# Patient Record
Sex: MALE | Race: BLACK OR AFRICAN AMERICAN | NOT HISPANIC OR LATINO | Employment: OTHER | URBAN - METROPOLITAN AREA
[De-identification: names, ages, dates, MRNs, and addresses within clinical notes are randomized per-mention and may not be internally consistent; named-entity substitution may affect disease eponyms.]

---

## 2018-08-16 ENCOUNTER — OFFICE VISIT (OUTPATIENT)
Dept: UROLOGY | Facility: CLINIC | Age: 53
End: 2018-08-16
Payer: COMMERCIAL

## 2018-08-16 ENCOUNTER — HOSPITAL ENCOUNTER (OUTPATIENT)
Dept: RADIOLOGY | Facility: HOSPITAL | Age: 53
Discharge: HOME | End: 2018-08-16
Attending: UROLOGY
Payer: COMMERCIAL

## 2018-08-16 VITALS
BODY MASS INDEX: 35.08 KG/M2 | HEIGHT: 74 IN | HEART RATE: 61 BPM | DIASTOLIC BLOOD PRESSURE: 81 MMHG | RESPIRATION RATE: 16 BRPM | WEIGHT: 273.38 LBS | SYSTOLIC BLOOD PRESSURE: 131 MMHG

## 2018-08-16 DIAGNOSIS — N28.89 RIGHT RENAL MASS: Primary | ICD-10-CM

## 2018-08-16 DIAGNOSIS — N28.89 RIGHT RENAL MASS: ICD-10-CM

## 2018-08-16 PROCEDURE — 99203 OFFICE O/P NEW LOW 30 MIN: CPT | Mod: PBBFAC,25 | Performed by: UROLOGY

## 2018-08-16 PROCEDURE — 99205 OFFICE O/P NEW HI 60 MIN: CPT | Mod: S$PBB,,, | Performed by: UROLOGY

## 2018-08-16 PROCEDURE — 71046 X-RAY EXAM CHEST 2 VIEWS: CPT | Mod: TC

## 2018-08-16 PROCEDURE — 71046 X-RAY EXAM CHEST 2 VIEWS: CPT | Mod: 26,,, | Performed by: RADIOLOGY

## 2018-08-16 PROCEDURE — 99999 PR PBB SHADOW E&M-NEW PATIENT-LVL III: CPT | Mod: PBBFAC,,, | Performed by: UROLOGY

## 2018-08-16 RX ORDER — GABAPENTIN 300 MG/1
300 CAPSULE ORAL 3 TIMES DAILY
COMMUNITY
End: 2018-09-18 | Stop reason: ALTCHOICE

## 2018-08-16 NOTE — PROGRESS NOTES
Clinic Note  8/16/2018      Subjective:         Chief Complaint:   SABA Reynolds is a 53 y.o. male who was recently diagnosed with a right renal mass. Lesion measures 9.3 x 7.2 x 7.3 cm. Ct images reviewed. Single artery, single vein, no thrombus, no nodes. Lower pole lesion that extends deeply into hilum. No suggestion on collecting system origin on CT scan.  Here with his girlfriend Anna and mother. Retired from [employer].  Had CT scan for left sided back pain and stone rule out.    No past medical history on file.  No family history on file.  Social History     Socioeconomic History    Marital status: Legally      Spouse name: Not on file    Number of children: Not on file    Years of education: Not on file    Highest education level: Not on file   Social Needs    Financial resource strain: Not on file    Food insecurity - worry: Not on file    Food insecurity - inability: Not on file    Transportation needs - medical: Not on file    Transportation needs - non-medical: Not on file   Occupational History    Not on file   Tobacco Use    Smoking status: Former Smoker     Packs/day: 0.50     Years: 6.00     Pack years: 3.00    Smokeless tobacco: Never Used   Substance and Sexual Activity    Alcohol use: No     Frequency: Never    Drug use: No    Sexual activity: Yes     Partners: Female   Other Topics Concern    Not on file   Social History Narrative    Not on file     No past surgical history on file.  There is no problem list on file for this patient.    Review of Systems   Constitutional: Negative for appetite change, chills, fatigue, fever and unexpected weight change.   HENT: Negative for nosebleeds.    Respiratory: Negative for shortness of breath and wheezing.    Cardiovascular: Negative for chest pain, palpitations and leg swelling.   Gastrointestinal: Negative for abdominal distention, abdominal pain, constipation, diarrhea, nausea and vomiting.   Genitourinary:  "Negative for dysuria and hematuria.   Musculoskeletal: Negative for arthralgias and back pain.   Skin: Negative for pallor.   Neurological: Negative for dizziness, seizures and syncope.   Hematological: Negative for adenopathy.   Psychiatric/Behavioral: Negative for dysphoric mood.         Objective:      /81   Pulse 61   Resp 16   Ht 6' 2" (1.88 m)   Wt 124 kg (273 lb 5.9 oz)   BMI 35.10 kg/m²   Estimated body mass index is 35.1 kg/m² as calculated from the following:    Height as of this encounter: 6' 2" (1.88 m).    Weight as of this encounter: 124 kg (273 lb 5.9 oz).  Physical Exam   Constitutional: He is oriented to person, place, and time. He appears well-developed and well-nourished. No distress.   HENT:   Head: Atraumatic.   Neck: No tracheal deviation present.   Cardiovascular: Normal rate.    Pulmonary/Chest: Effort normal. No respiratory distress. He has no wheezes.   Abdominal: Soft. Bowel sounds are normal. He exhibits no distension and no mass. There is no tenderness. There is no rebound and no guarding.   Neurological: He is alert and oriented to person, place, and time.   Skin: Skin is warm and dry. He is not diaphoretic.     Psychiatric: He has a normal mood and affect. His behavior is normal. Judgment and thought content normal.         Assessment and Plan:           Problem List Items Addressed This Visit     None      Visit Diagnoses     Right renal mass    -  Primary          Follow up:    Long discussion about management of SRMs. Discussed malignant vs benign histology.  Discussed surveillance and surveillance  protocol, biopsy, ablation techniques ( including cryoabalation and HFRA), and resection techniques including robotic and open partial.Also discussed open, lap and robotic nephrectomy. Discussed risks and complications of all procedures, risk of reoperation and risks of recurrence and need for additional therapy Answered questions and addressed concerns.  With size and " location of tumor recommend lap nephrectomy.  Discussed risks, and complications.  The patient will meet with our  Michelle today to find a date for surgery and begin preparation for surgery. Will also receive our handout on NPO guidelines and preop hydration. Patient will also receive information and education on preoperative skin preparation and postop wound care.   CXR, CMP,CBC to complete staging.  I spent over 45 minutes with the patient. Over 50% of the visit was spent in counseling.     Keven Vivar

## 2018-08-22 ENCOUNTER — TELEPHONE (OUTPATIENT)
Dept: UROLOGY | Facility: CLINIC | Age: 53
End: 2018-08-22

## 2018-08-22 DIAGNOSIS — N28.89 RIGHT RENAL MASS: Primary | ICD-10-CM

## 2018-09-12 ENCOUNTER — ANESTHESIA EVENT (OUTPATIENT)
Dept: SURGERY | Facility: HOSPITAL | Age: 53
DRG: 661 | End: 2018-09-12
Payer: COMMERCIAL

## 2018-09-12 ENCOUNTER — TELEPHONE (OUTPATIENT)
Dept: PREADMISSION TESTING | Facility: HOSPITAL | Age: 53
End: 2018-09-12

## 2018-09-12 DIAGNOSIS — Z01.818 PREOP TESTING: Primary | ICD-10-CM

## 2018-09-12 RX ORDER — ALLOPURINOL 100 MG/1
100 TABLET ORAL DAILY PRN
COMMUNITY

## 2018-09-12 NOTE — PRE ADMISSION SCREENING
Anesthesia Assessment: Preoperative EQUATION    Planned Procedure: Procedure(s) (LRB):  NEPHRECTOMY, LAPAROSCOPIC/ POSS OPEN (Right)  Requested Anesthesia Type:General  Surgeon: Keven Vivar MD  Service: Urology  Known or anticipated Date of Surgery:9/24/2018    Surgeon notes: reviewed    Electronic QUestionnaire Assessment completed via nurse interview with patient.        No aq        Triage considerations:         Previous anesthesia records:Not available  Knee surgery    Last PCP note: sees a nurse practioner in [facility]  Subspecialty notes: urology    Other important co-morbidities:   HTN  Gout  Right renal Mass     Tests already available:  Available tests,  within 1 month , within [facility] .            Instructions given. (See in Nurse's note)    Optimization:  Anesthesia Preop Clinic Assessment  Indicated    Medical Opinion Indicated           Plan:    Testing:  T&S and EKG   Pre-anesthesia  visit       Visit focus: concerns in complex and/or prolonged anesthesia     Consultation:IM Perioperative Hospitalist     Navigation: Tests Scheduled.              Consults scheduled.             Results will be tracked by Preop Clinic.

## 2018-09-12 NOTE — TELEPHONE ENCOUNTER
----- Message from Antoinette Esqueda RN sent at 9/12/2018 11:05 AM CDT -----  Needs EKG, LAB, POC, OPOC coming  9/19/18 to see Cb

## 2018-09-12 NOTE — ANESTHESIA PREPROCEDURE EVALUATION
Anesthesia Assessment: Preoperative EQUATION     Planned Procedure: Procedure(s) (LRB):  NEPHRECTOMY, LAPAROSCOPIC/ POSS OPEN (Right)  Requested Anesthesia Type:General  Surgeon: Keven Vivar MD  Service: Urology  Known or anticipated Date of Surgery:9/24/2018     Surgeon notes: reviewed     Electronic QUestionnaire Assessment completed via nurse interview with patient.         No aq           Triage considerations:            Previous anesthesia records:Not available  Knee surgery     Last PCP note: sees a nurse practioner in [facility]  Subspecialty notes: urology     Other important co-morbidities:   HTN  Gout  Right renal Mass     Tests already available:  Available tests,  within 1 month , within [facility] .                            Instructions given. (See in Nurse's note)     Optimization:  Anesthesia Preop Clinic Assessment  Indicated    Medical Opinion Indicated                                        Plan:    Testing:  T&S and EKG   Pre-anesthesia  visit                                        Visit focus: concerns in complex and/or prolonged anesthesia                           Consultation:IM Perioperative Hospitalist      Navigation: Tests Scheduled.                         Consults scheduled.                        Results will be tracked by Preop Clinic.                                                                                                              09/12/2018  Jesu Reynolds is a 53 y.o., male.  Pre-operative evaluation for Procedure(s) (LRB):  NEPHRECTOMY, LAPAROSCOPIC/ POSS OPEN (Right)    Jesu Reynolds is a 53 y.o. male     LDA:     Prev airway:     Drips:     Patient Active Problem List   Diagnosis    Essential hypertension    Gout    Arthritis    Class 2 obesity with body mass index (BMI) of 35.0 to 35.9 in adult    Tattoos    Snoring    Renal impairment    Hypercalcemia    Serum total bilirubin elevated    Right renal mass    Fatty liver       Review  of patient's allergies indicates:  No Known Allergies     No current facility-administered medications on file prior to encounter.      Current Outpatient Medications on File Prior to Encounter   Medication Sig Dispense Refill    allopurinol (ZYLOPRIM) 100 MG tablet Take 100 mg by mouth every morning.       azilsartan med-chlorthalidone (EDARBYCLOR) 40-25 mg Tab Take by mouth every evening.          Past Surgical History:   Procedure Laterality Date    KNEE CARTILAGE SURGERY Right     5        Social History     Socioeconomic History    Marital status: Legally      Spouse name: Not on file    Number of children: Not on file    Years of education: Not on file    Highest education level: Not on file   Social Needs    Financial resource strain: Not on file    Food insecurity - worry: Not on file    Food insecurity - inability: Not on file    Transportation needs - medical: Not on file    Transportation needs - non-medical: Not on file   Occupational History    Not on file   Tobacco Use    Smoking status: Former Smoker     Packs/day: 0.50     Years: 6.00     Pack years: 3.00     Last attempt to quit:      Years since quittin.7    Smokeless tobacco: Never Used   Substance and Sexual Activity    Alcohol use: No     Frequency: Never    Drug use: No    Sexual activity: Yes     Partners: Female   Other Topics Concern    Not on file   Social History Narrative    Not on file         Vital Signs Range (Last 24H):  Temp:  [36.4 °C (97.5 °F)]   Pulse:  [65]   Resp:  [16]   BP: (146)/(84)   SpO2:  [99 %]       CBC: No results for input(s): WBC, RBC, HGB, HCT, PLT, MCV, MCH, MCHC in the last 72 hours.    CMP: No results for input(s): NA, K, CL, CO2, BUN, CREATININE, GLU, MG, PHOS, CALCIUM, ALBUMIN, PROT, ALKPHOS, ALT, AST, BILITOT in the last 72 hours.          Diagnostic Studies:        Anesthesia Evaluation         Review of Systems  Anesthesia Hx:  No problems with previous Anesthesia  History of prior surgery of interest to airway management or planning: Previous anesthesia: MAC  10/2013: Colonoscopy with MAC.  Denies Family Hx of Anesthesia complications.   Denies Personal Hx of Anesthesia complications.   Social:  Patient's occupation is Retired. Tobacco Use: Former smoker for 6 years, quit smoking >10 years ago Denies Alcohol Use.   Hematology/Oncology:        Hematology Comments: Hypercalcemia: 11.0 on 8/16/18 labs   EENT/Dental:   Denies Throat Symptoms Denies Jaw Problems   Cardiovascular:   Hypertension, well controlled  Functional Capacity Plays basketball: denies CP/SOB  Denies Deep Venous Thrombosis (DVT)  Hypertension , Recent typical clinic B/P of 114/75 @ POC visit    Pulmonary:  Denies Asthma.  Denies Chronic Obstructive Pulmonary Disease (COPD).  Possible Obstructive Sleep Apnea , (STOP/BANG) Symptoms S - Snoring (loud), A - Age > 50 and G - Gender (Male > Female)    Renal/:   Right renal mass Denies Kidney Function/Disease    Hepatic/GI:  Esophageal / Stomach Disorders Gerd Controlled by PRN antireflux medication.  Liver Disease, Fatty Liver    Musculoskeletal:  Joint Disease:  Arthritis, Gout    Neurological:  Denies Seizure Disorder  Denies CVA - Cerebrovasular Accident  Denies TIA - Transient Ischemic Attack    Endocrine:  Denies Diabetes  Denies Thyroid Disease        Physical Exam  General:  Obesity, Well nourished    Airway/Jaw/Neck:  Airway Findings: Mouth Opening: Normal Tongue: Normal  General Airway Assessment: Adult  Mallampati: I  TM Distance: Normal, at least 6 cm  Jaw/Neck Findings:  Neck ROM: Normal ROM      Dental:  Dental Findings: In tact        Mental Status:  Mental Status Findings:  Cooperative, Alert and Oriented         Anesthesia Plan  Type of Anesthesia, risks & benefits discussed:  Anesthesia Type:  general  Patient's Preference:   Intra-op Monitoring Plan:   Intra-op Monitoring Plan Comments:   Post Op Pain Control Plan:   Post Op Pain Control Plan  Comments:   Induction:   IV  Beta Blocker:  Patient is not currently on a Beta-Blocker (No further documentation required).       Informed Consent: Patient understands risks and agrees with Anesthesia plan.  Questions answered. Anesthesia consent signed with patient.  ASA Score: 3     Day of Surgery Review of History & Physical:            Ready For Surgery From Anesthesia Perspective.     The patient was seen by Perioperative Internal Medicine physician Dr. Lozada on 9/19/18 , please see recommendations.        Jose Angel Oro RN

## 2018-09-18 PROBLEM — I10 ESSENTIAL HYPERTENSION: Status: ACTIVE | Noted: 2018-09-18

## 2018-09-19 ENCOUNTER — INITIAL CONSULT (OUTPATIENT)
Dept: INTERNAL MEDICINE | Facility: CLINIC | Age: 53
End: 2018-09-19
Payer: COMMERCIAL

## 2018-09-19 ENCOUNTER — OFFICE VISIT (OUTPATIENT)
Dept: UROLOGY | Facility: CLINIC | Age: 53
End: 2018-09-19
Payer: COMMERCIAL

## 2018-09-19 ENCOUNTER — HOSPITAL ENCOUNTER (OUTPATIENT)
Dept: PREADMISSION TESTING | Facility: HOSPITAL | Age: 53
Discharge: HOME | End: 2018-09-19
Attending: ANESTHESIOLOGY
Payer: COMMERCIAL

## 2018-09-19 ENCOUNTER — HOSPITAL ENCOUNTER (OUTPATIENT)
Dept: CARDIOLOGY | Facility: CLINIC | Age: 53
Discharge: HOME | End: 2018-09-19
Payer: COMMERCIAL

## 2018-09-19 VITALS
BODY MASS INDEX: 35.29 KG/M2 | DIASTOLIC BLOOD PRESSURE: 75 MMHG | HEART RATE: 77 BPM | WEIGHT: 275 LBS | OXYGEN SATURATION: 96 % | TEMPERATURE: 98 F | HEIGHT: 74 IN | RESPIRATION RATE: 16 BRPM | SYSTOLIC BLOOD PRESSURE: 114 MMHG

## 2018-09-19 VITALS
TEMPERATURE: 98 F | SYSTOLIC BLOOD PRESSURE: 114 MMHG | HEIGHT: 74 IN | DIASTOLIC BLOOD PRESSURE: 75 MMHG | WEIGHT: 275 LBS | HEART RATE: 77 BPM | BODY MASS INDEX: 35.29 KG/M2 | OXYGEN SATURATION: 96 %

## 2018-09-19 DIAGNOSIS — N28.89 RIGHT RENAL MASS: ICD-10-CM

## 2018-09-19 DIAGNOSIS — Z01.818 PREOP TESTING: ICD-10-CM

## 2018-09-19 DIAGNOSIS — I10 ESSENTIAL HYPERTENSION: ICD-10-CM

## 2018-09-19 DIAGNOSIS — N28.9 RENAL IMPAIRMENT: ICD-10-CM

## 2018-09-19 DIAGNOSIS — L81.8 TATTOOS: ICD-10-CM

## 2018-09-19 DIAGNOSIS — R06.83 SNORING: ICD-10-CM

## 2018-09-19 DIAGNOSIS — M10.9 GOUT, UNSPECIFIED CAUSE, UNSPECIFIED CHRONICITY, UNSPECIFIED SITE: ICD-10-CM

## 2018-09-19 DIAGNOSIS — E83.52 HYPERCALCEMIA: ICD-10-CM

## 2018-09-19 DIAGNOSIS — Z01.818 PREOP EXAMINATION: Primary | ICD-10-CM

## 2018-09-19 DIAGNOSIS — M19.90 ARTHRITIS: ICD-10-CM

## 2018-09-19 DIAGNOSIS — R17 SERUM TOTAL BILIRUBIN ELEVATED: ICD-10-CM

## 2018-09-19 DIAGNOSIS — K76.0 FATTY LIVER: ICD-10-CM

## 2018-09-19 DIAGNOSIS — E66.9 CLASS 2 OBESITY WITH BODY MASS INDEX (BMI) OF 35.0 TO 35.9 IN ADULT, UNSPECIFIED OBESITY TYPE, UNSPECIFIED WHETHER SERIOUS COMORBIDITY PRESENT: ICD-10-CM

## 2018-09-19 PROCEDURE — 99213 OFFICE O/P EST LOW 20 MIN: CPT | Mod: PBBFAC,25 | Performed by: HOSPITALIST

## 2018-09-19 PROCEDURE — 99499 UNLISTED E&M SERVICE: CPT | Mod: S$PBB,,, | Performed by: UROLOGY

## 2018-09-19 PROCEDURE — 99204 OFFICE O/P NEW MOD 45 MIN: CPT | Mod: S$PBB,,, | Performed by: HOSPITALIST

## 2018-09-19 PROCEDURE — 93010 ELECTROCARDIOGRAM REPORT: CPT | Mod: S$PBB,,, | Performed by: INTERNAL MEDICINE

## 2018-09-19 PROCEDURE — 93005 ELECTROCARDIOGRAM TRACING: CPT | Mod: PBBFAC | Performed by: INTERNAL MEDICINE

## 2018-09-19 PROCEDURE — 99999 PR PBB SHADOW E&M-EST. PATIENT-LVL III: CPT | Mod: PBBFAC,,, | Performed by: HOSPITALIST

## 2018-09-19 RX ORDER — HEPARIN SODIUM 5000 [USP'U]/ML
5000 INJECTION, SOLUTION INTRAVENOUS; SUBCUTANEOUS
Status: CANCELLED | OUTPATIENT
Start: 2018-09-19 | End: 2018-09-19

## 2018-09-19 RX ORDER — LIDOCAINE HYDROCHLORIDE 10 MG/ML
1 INJECTION, SOLUTION EPIDURAL; INFILTRATION; INTRACAUDAL; PERINEURAL ONCE
Status: CANCELLED | OUTPATIENT
Start: 2018-09-19 | End: 2018-09-19

## 2018-09-19 RX ORDER — ACETAMINOPHEN 10 MG/ML
1000 INJECTION, SOLUTION INTRAVENOUS
Status: CANCELLED | OUTPATIENT
Start: 2018-09-19 | End: 2018-09-19

## 2018-09-19 RX ORDER — SODIUM CHLORIDE 9 MG/ML
INJECTION, SOLUTION INTRAVENOUS CONTINUOUS
Status: CANCELLED | OUTPATIENT
Start: 2018-09-19

## 2018-09-19 NOTE — ASSESSMENT & PLAN NOTE
Azilsartan,HCTZ  Nightly   Home BP - 115-117/ 75-80  Hypertension-  Blood pressure is acceptable . I suggest continuation of Azilsartan,HCTZ during the entire perioperative period..I suggest addressing pain control as uncontrolled pain can increased blood pressure

## 2018-09-19 NOTE — ASSESSMENT & PLAN NOTE
Not known to have elevated calcium   Suggest hydration, follow up, cardiac monitoring   Chlorthalidone could be contributing   Usually drinks about a gallon of water a day

## 2018-09-19 NOTE — ASSESSMENT & PLAN NOTE
Risk factor for Hepatitis C discussed   To his understanding no liver disease   No suggestion of hepatic decompensation

## 2018-09-19 NOTE — DISCHARGE INSTRUCTIONS
Your surgery has been scheduled for:__________________________________________    You should report to:  ____[facility], located on the [place] side of the first floor of the           [facility] ([phone])  ____[facility], located on the [facility] side of the            Second floor of the [facility] ([phone])  ____3rd Floor [facility] located on the [facility] side of the [facility] [phone]  Please Note   - Tell your doctor if you take Aspirin, products containing Aspirin, herbal medications  or blood thinners, such as Coumadin, Ticlid, or Plavix.  (Consult your provider regarding holding or stopping before surgery).  - Arrange for someone to drive you home following surgery.  You will not be allowed to leave the surgical facility alone or drive yourself home following sedation and anesthesia.  Before Surgery  - Stop taking all herbal medications 14days prior to surgery  - No Motrin/Advil (Ibuprofen) 7 days before surgery  - No Aleve (Naproxen) 7 days before surgery  - Stop Taking Asprin, products containing Asprin _____days before surgery  - Stop taking blood thinners_______days before surgery  - No Goody's/BC  Powder 7 days before surgery  - Refrain from drinking alcoholic beverages for 24hours before and after surgery  - Stop or limit smoking _________days before surgery  - You may take Tylenol for pain  Night before Surgery  Stop ALL solid food, gum, candy (including vitamins) 8 hours before arrival time.  (Please note: If your surgeon gives you different eating and drinking instructions, please follow surgeon's directions.)  Stop all CLOUDY liquids: coffee with creamer, formula, tube feeds, cloudy juices, non-human milk and breast milk with additives, 6 hours prior to arrival time.  Stop plain breast milk 4 hours prior to arrival time.  The patient should be ENCOURAGED to drink carbohydrate-rich  clear liquids (sports drinks, clear juices) until 2 hours prior to arrival time.  CLEAR liquids include only water, black coffee NO creamer, clear oral rehydration drinks, clear sports drinks or clear fruit juices (no orange juice, no pulpy juices, no apple cider). Advise patients if they can read newsprint through the liquid, it qualifies as clear liquid.   IF IN DOUBT, drink water instead.   - Take a shower or bath (shower is recommended).  Bathe with Hibiclens soap or an antibacterial soap from the neck down.  If not supplied by your surgeon, hibiclens soap will need to be purchased over the counter in pharmacy.  Rinse soap off thoroughly.  - Shampoo your hair with your regular shampoo  The Day of Surgery  · NOTHING TO  DRINK 2 hours before arrival time. If you are told to take medication on the morning of surgery, it may be taken with a sip of water.   - Take another bath or shower with hibiclens or any antibacterial soap, to reduce the chance of infection.  - Take heart and blood pressure medications with a small sip of water, as advised by the perioperative team.  - Do not take fluid pills  - You may brush your teeth and rinse your mouth, but do not swall any additional water.   - Do not apply perfumes, powder, body lotions or deodorant on the day of surgery.  - Nail polish should be removed.  - Do not wear makeup or moisturizer  - Wear comfortable clothes, such as a button front shirt and loose fitting pants.  - Leave all jewelry, including body piercings, and valuables at home.    - Bring any devices you will neeed after surgery such as crutches or canes.  - If you have sleep apnea, please bring your CPAP machine  In the event that your physical condition changes including the onset of a cold or respiratory illness, or if you have to delay or cancel your surgery, please notify your surgeon.  Anesthesia: General Anesthesia     You are watched continuously during your procedure by your anesthesia provider.      Youre due to have surgery. During surgery, youll be given medicine called anesthesia or anesthetic. This will keep you comfortable and pain-free. Your anesthesia provider will use general anesthesia.  What is general anesthesia?  General anesthesia puts you into a state like deep sleep. It goes into the bloodstream (IV anesthetics), into the lungs (gas anesthetics), or both. You feel nothing during the procedure. You will not remember it. During the procedure, the anesthesia provider monitors you continuously. He or she checks your heart rate and rhythm, blood pressure, breathing, and blood oxygen.  · IV anesthetics. IV anesthetics are given through an IV line in your arm. Theyre often given first. This is so you are asleep before a gas anesthetic is started. Some kinds of IV anesthetics relieve pain. Others relax you. Your doctor will decide which kind is best in your case.  · Gas anesthetics. Gas anesthetics are breathed into the lungs. They are often used to keep you asleep. They can be given through a facemask or a tube placed in your larynx or trachea (breathing tube).  ? If you have a facemask, your anesthesia provider will most likely place it over your nose and mouth while youre still awake. Youll breathe oxygen through the mask as your IV anesthetic is started. Gas anesthetic may be added through the mask.  ? If you have a tube in the larynx or trachea, it will be inserted into your throat after youre asleep.  Anesthesia tools and medicines  You will likely have:  · IV anesthetics. These are put into an IV line into your bloodstream.  · Gas anesthetics. You breathe these anesthetics into your lungs, where they pass into your bloodstream.  · Pulse oximeter. This is a small clip that is attached to the end of your finger. This measures your blood oxygen level.  · Electrocardiography leads (electrodes). These are small sticky pads that are placed on your chest. They record your heart rate and  rhythm.  · Blood pressure cuff. This reads your blood pressure.  Risks and possible complications  General anesthesia has some risks. These include:  · Breathing problems  · Nausea and vomiting  · Sore throat or hoarseness (usually temporary)  · Allergic reaction to the anesthetic  · Irregular heartbeat (rare)  · Cardiac arrest (rare)   Anesthesia safety  · Follow all instructions you are given for how long not to eat or drink before your procedure.  · Be sure your doctor knows what medicines and drugs you take. This includes over-the-counter medicines, herbs, supplements, alcohol or other drugs. You will be asked when those were last taken.  · Have an adult family member or friend drive you home after the procedure.  · For the first 24 hours after your surgery:  ? Do not drive or use heavy equipment.  ? Do not make important decisions or sign legal documents. If important decisions or signing legal documents is necessary during the first 24 hours after surgery, have a trusted family member or spouse act on your behalf.  ? Avoid alcohol.  ? Have a responsible adult stay with you. He or she can watch for problems and help keep you safe.  Date Last Reviewed: 12/1/2016  © 4700-2383 [employer] [address] [place] [address], [place] [address]. All rights reserved. This information is not intended as a substitute for professional medical care. Always follow your healthcare professional's instructions

## 2018-09-19 NOTE — ASSESSMENT & PLAN NOTE
Lost 80 pounds over the years , intentionally  Not known to  have sleep apnea , diabetes   Significant other reports snoring ,apnea  Encouraged weight loss

## 2018-09-19 NOTE — HPI
History of present illness- I had the pleasure of meeting this pleasant 53 y.o. gentleman in the pre op clinic prior to his elective Urological surgery. The patient is new to me . Jesu was accompanied by significant other Anna.    I have obtained the history by speaking to the patient and by reviewing the electronic health records.    Events leading up to surgery / History of presenting illness -    Right renal mass  Found it incidentally about 3-4  weeks ago , when he had a CT scan for a Left sided mid abdomen  - through back pain ( unlike sciatic pain that he usually has ) and more pain that sciatic pain. usually has no pain on the front of the abdomen   Also has pain down the Left side which he also did not have  this time  Went to ER at [facility] and had a plain CT scan that to his understanding did not know kidney stone on the Left side , how ever showed a Rt renal mass   Had abdominal pain , 1 week before ( Prior to ER presentation ) , felt like a muscle , could not correlate to food, bowels, bladder   To his understanding no UTI ( Both in ER and by the Urologist )   Was discharged from ER, pain resolved by then  Went to Urologist and had had a CT with contrast and was referred to [facility]   No longer has pain that lead to ER presentation   No pain from this .No aggravating factors. No relieving factors     Relevant health conditions of significance for the perioperative period/ History of presenting illness -    Patient Active Problem List    Diagnosis Date Noted    Fatty liver 09/20/2018    Gout 09/19/2018    Arthritis 09/19/2018    Class 2 obesity with body mass index (BMI) of 35.0 to 35.9 in adult 09/19/2018    Tattoos 09/19/2018    Snoring 09/19/2018    Renal impairment 09/19/2018    Hypercalcemia 09/19/2018    Serum total bilirubin elevated 09/19/2018    Right renal mass 09/19/2018    Essential hypertension 09/18/2018     Not known to have heart disease , Diabetes Mellitus, Lung  disease

## 2018-09-19 NOTE — LETTER
September 19, 2018      Keven Vivar MD  [address]  [place] [address]           [facility] - Pre Op Consult  [address] [facility] [address]  Phone: [phone]          Patient: Jesu Reynolds   MR Number: [id number]   YOB: 1965   Date of Visit: 9/19/2018       Dear Dr. Maite Cheney:    Thank you for referring Jesu Reynolds to me for evaluation. Attached you will find relevant portions of my assessment and plan of care.    If you have questions, please do not hesitate to call me. I look forward to following Jesu Reynolds along with you.    Sincerely,    Sammi Lozada MD    Enclosure  CC:  Maite Cheney MD    If you would like to receive this communication electronically, please contact [email or web address] or [phone] to request more information on [email or web address] Link access.    For providers and/or their staff who would like to refer a patient to [facility], please contact us through our one-stop-shop provider referral line, [facility], at [phone].    If you feel you have received this communication in error or would no longer like to receive these types of communications, please e-mail [email or web address]

## 2018-09-19 NOTE — ASSESSMENT & PLAN NOTE
8/16/2018 - Creatinine 1.4   Not known to have kidney problem   chronic NSAID use for many years - quit 15 years ago   NSAID effects , hydration discussed    I  suggest monitoring renal function, in put and out put status mariel-operatively. I  suggest avoiding nephrotoxic medication including NSAIDs, COX2 inhibitors, intravenous contrast agent,avoiding hypotension to prevent further renal impairment.

## 2018-09-19 NOTE — ASSESSMENT & PLAN NOTE
Was fasting that day raising the possibility of Gilbert's syndrome   Normal alkaline phosphatase  Suggested follow up

## 2018-09-19 NOTE — ASSESSMENT & PLAN NOTE
Possible sleep apnea- I suggest a sleep study and suggest caution with usage of medication that can cause respiratory suppression in the perioperative period  potential ramifications of untreated sleep apnea, which could include daytime sleepiness, hypertension, heart disease and stroke were discussed  Avoidance of supine sleep, weight gain and alcoholic beverages discussed since all of these can worsen SELENE

## 2018-09-19 NOTE — PROGRESS NOTES
[facility] - Pre Op Consult  Progress Note    Patient Name: Jesu Reynolds  MRN: [id number]  Date of Evaluation- 09/20/2018  PCP- Primary Doctor No    Future cases for Jesu Reynolds [[id number]]     Case ID Status Date Time [place] Procedure Provider Location    [id number] [facility] 9/24/2018 11:00  NEPHRECTOMY, LAPAROSCOPIC/ POSS OPEN Keven Vivar MD [[id number]] [facility] OR 2ND FLR          HPI:  History of present illness- I had the pleasure of meeting this pleasant 53 y.o. gentleman in the pre op clinic prior to his elective Urological surgery. The patient is new to me . Jseu was accompanied by significant other Anna.    I have obtained the history by speaking to the patient and by reviewing the electronic health records.    Events leading up to surgery / History of presenting illness -    Right renal mass  Found it incidentally about 3-4  weeks ago , when he had a CT scan for a Left sided mid abdomen  - through back pain ( unlike sciatic pain that he usually has ) and more pain that sciatic pain. usually has no pain on the front of the abdomen   Also has pain down the Left side which he also did not have  this time  Went to ER at [facility] and had a plain CT scan that to his understanding did not know kidney stone on the Left side , how ever showed a Rt renal mass   Had abdominal pain , 1 week before ( Prior to ER presentation ) , felt like a muscle , could not correlate to food, bowels, bladder   To his understanding no UTI ( Both in ER and by the Urologist )   Was discharged from ER, pain resolved by then  Went to Urologist and had had a CT with contrast and was referred to [facility]   No longer has pain that lead to ER presentation   No pain from this .No aggravating factors. No relieving factors     Relevant health conditions of significance for the perioperative period/ History of presenting illness -    Patient Active Problem List    Diagnosis Date Noted    Fatty liver 09/20/2018    Gout 09/19/2018     Arthritis 09/19/2018    Class 2 obesity with body mass index (BMI) of 35.0 to 35.9 in adult 09/19/2018    Tattoos 09/19/2018    Snoring 09/19/2018    Renal impairment 09/19/2018    Hypercalcemia 09/19/2018    Serum total bilirubin elevated 09/19/2018    Right renal mass 09/19/2018    Essential hypertension 09/18/2018     Not known to have heart disease , Diabetes Mellitus, Lung disease       Subjective/ Objective:          Chief complaint-Preoperative evaluation, Perioperative Medical management, complication reduction plan     Active cardiac conditions- none    Revised cardiac risk index predictors- none    Functional capacity -Examples of physical activity , 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       Review of Systems   Constitutional: Negative for chills and fever.        No unusual weight changes     HENT:        STOPBANG score  8/ 8    Loud Snoring   Napping during the day   Witnessed stopping of breathing   HTN  BMI over 35   Age over 50 years  Neck size over 40 CM  Male gender    Encouraged to have sleep study  Wants to loose weight    Eyes:        No unusual vision changes   Respiratory:          Cough with clear  phlegm , attributes to sinus problem   No change in color , consistency, amount of phlegm   No Hemoptysis   Cardiovascular:        As noted   Gastrointestinal:        Bowels- Regular  No overt GI/ blood losses   Endocrine:        Prednisone use > 20 mg daily for 3 weeks- None   Genitourinary: Negative for dysuria.        No urinary hesitancy    Musculoskeletal:        As above      Skin: Negative for rash.   Neurological: Negative for syncope.        No unilateral weakness  "  Hematological:        Current use of Anticoagulants  Antiplatelet agents  None   Psychiatric/Behavioral:        No Depression,Anxiety       No vascular stenting   No past medical history pertinent negatives.    Past Surgical History:   Procedure Laterality Date    KNEE CARTILAGE SURGERY         No anesthesia, bleeding, cardiac problems, PONV with previous surgeries/procedures.  Medications and Allergies reviewed in epic.   FH- No anesthesia,bleeding / venous thrombosis , early onset heart disease in family   Lives with  family, who can help     Physical Exam  Blood pressure 114/75, pulse 77, temperature 98.1 °F (36.7 °C), temperature source Oral, height 6' 2" (1.88 m), weight 124.7 kg (275 lb), SpO2 96 %.      Physical Exam  Constitutional- Vitals - Body mass index is 35.31 kg/m².,   Vitals:    09/19/18 1310   BP: 114/75   Pulse: 77   Temp: 98.1 °F (36.7 °C)     General appearance-Conscious,Coherent  Eyes- No conjunctival icterus,pupils  round  and reactive to light   ENT-Oral cavity- moist  , Hearing grossly normal   Neck- No thyromegaly ,Trachea -central, No jugular venous distension,   No Carotid Bruit   Cardiovascular -Heart Sounds- Normal  and  no murmur   , No gallop rhythm   Respiratory - Normal Respiratory Effort, Normal breath sounds,  no wheeze  and  no forced expiratory wheeze    Peripheral pitting pedal edema-- none , no calf pain   Gastrointestinal -Soft abdomen, No palpable masses, Non Tender,Liver,Spleen not palpable. No-- free fluid and shifting dullness  Musculoskeletal- No finger Clubbing. Strength grossly normal   Lymphatic-No Palpable cervical, axillary,Inguinal lymphadenopathy   Psychiatric - normal effect,Orientation  Rt Dorsalis pedis pulses-palpable    Lt Dorsalis pedis pulses- palpable   Rt Posterior tibial pulses -palpable   Left posterior tibial pulses -palpable   Miscellaneous -  no asterixis,  no dupuytren's contracture,  no renal bruit and  tattoos  Investigations  Lab and Imaging " have been reviewed in [facility].    Review of Medicine tests    EKG- I had independently reviewed the EKG from--9/19/2018   Report pending     Review of clinical lab tests:  Lab Results   Component Value Date    CREATININE 1.4 08/16/2018    HGB 15.0 08/16/2018     08/16/2018     CXR - 8/16/2018- No significant abnormality        Review of old records- Was done and information gathered regards to events leading to surgery and health conditions of significance in the perioperative period.        Preoperative cardiac risk assessment-  The patient does not have any active cardiac conditions . Revised cardiac risk index predictors- -0 ( 1 , if it is open surgery ) --.Functional capacity is more than 4 Mets. He will be undergoing a Urological procedure that carries a intermediate risk ( Higher, if open)     The estimated risk of the rate of adverse cardiac outcomes  0.4%( 0.9 %, if open)     No further cardiac work up is indicated prior to proceeding with the surgery          American Society of Anesthesiologists Physical status classification ( ASA ) class: 3     Postoperative pulmonary complication risk assessment:      ARISCAT ( [place]) risk index- risk class -  Low, if duration of surgery is under 3 hours, intermediate, if duration of surgery is over 3 hours      [place] Respiratory failure index- percentage risk of respiratory failure: 0.5 %     Assessment/Plan:     Essential hypertension  Azilsartan,HCTZ  Nightly   Home BP - 115-117/ 75-80  Hypertension-  Blood pressure is acceptable . I suggest continuation of Azilsartan,HCTZ during the entire perioperative period..I suggest addressing pain control as uncontrolled pain can increased blood pressure     Tattoos  Risk factor for Hepatitis C discussed   To his understanding no liver disease   No suggestion of hepatic decompensation     Class 2 obesity with body mass index (BMI) of 35.0 to 35.9 in adult  Lost 80 pounds over the years , intentionally  Not known to   have sleep apnea , diabetes   Significant other reports snoring ,apnea  Encouraged weight loss     Snoring    Possible sleep apnea- I suggest a sleep study and suggest caution with usage of medication that can cause respiratory suppression in the perioperative period  potential ramifications of untreated sleep apnea, which could include daytime sleepiness, hypertension, heart disease and stroke were discussed  Avoidance of supine sleep, weight gain and alcoholic beverages discussed since all of these can worsen SELENE         Gout  Gout -I suggest continuation of the maintenance treatment for gout and to keep the patient adequately hydrated.Please note that surgical stress ,dehydration can precipitate acute gout         Arthritis  Degenerative arthritis - knees , hands   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 I  suggest avoiding nephrotoxic medication including NSAIDs, COX2 inhibitors, intravenous contrast agent,avoiding hypotension to prevent further renal impairment.        Hypercalcemia  Not known to have elevated calcium   Suggest hydration, follow up, cardiac monitoring   Chlorthalidone could be contributing   Usually drinks about a gallon of water a day    Serum total bilirubin elevated  Was fasting that day raising the possibility of Gilbert's syndrome   Normal alkaline phosphatase  Suggested follow up     Fatty liver  Weight loss will help         Preventive perioperative care    Thromboembolic prophylaxis:  His risk factors for thrombosis include obesity, surgical procedure and age.I suggest  thromboembolic prophylaxis (  mechanical/pharmacological, weighing the risk benefits of pharmacological agent use considering mariel procedural bleeding )  during the perioperative period.I suggested being active in the post operative period.      Postoperative pulmonary complication prophylaxis-Risk factors for post operative pulmonary complications include possible sleep apnea  ASA class >2 and proximity of the surgical site to the lungs- I suggest incentive spirometry use, early ambulation, end tidal carbon dioxide monitoring and pain control so as to avoid diaphragmatic splinting  , oral care , head end of bed elevation      Renal complication prophylaxis-Risk factors for renal complications include pre-existing renal disease and hypertension . I suggest keeping him well hydrated and avoidance/ minimizing the use of  NSAID's,ABEL 2 Inhibitors ,IV contrast if possible in the perioperative period.I suggested drinking 2 litre's of water a day      Surgical site Infection Prophylaxis-I  suggest appropriate antibiotic for Prophylaxis against Surgical site infections      In view of urological procedure the patient  is at risk of postoperative urinary retention.  I suggest avoidance / minimizing the of  Benzodiazepines,Anticholinergic medication,antihistamines ( Benadryl) , if possible in the perioperative period. I suggest using the minimum possible use of opioids for the minimum period of time in the perioperative period. Benadryl avoidance suggested      This visit was focused on Preoperative evaluation, Perioperative Medical management, complication reduction plans. I suggest that the patient follows up with primary care or relevant sub specialists for ongoing health care.    I appreciate the opportunity to be involved in this patients care. Please feel free to contact me if there were any questions about this consultation.    Patient is optimized     Patient was instructed to call and update me about any changes to health,  medication, office  visits ,testing out side of the [facility] operative care center , hospitalizations between now and surgery     Sammi Lozada MD  Perioperative Medicine  [facility]   Pager [phone]  -----  9/19- 18 30     EKG report  Normal sinus rhythm  Left atrial abnormality/enlargement  No previous ECGs available  ---  9/20- 18 43     Creatinine 1.23 - on 4/30/2018   Calcium 11.4   CT from Sept 2018 showed diffuse fatty infiltration of liver   ------  9/21- 16 50     Called to follow up , to address any concerns with the up coming surgery or any questions on Medication instructions -  Doing good, ready for surgery  ,No changes to Medication, Health-   He had No questions   No suggestion  hepatic decompensation   Fatty liver , weight loss discussed   Follow up about hypercalcemia discussed   Not on supplements, Multi vitamin , Vit D

## 2018-09-19 NOTE — OUTPATIENT SUBJECTIVE & OBJECTIVE
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       Review of Systems   Constitutional: Negative for chills and fever.        No unusual weight changes     HENT:        STOPBANG score  8/ 8    Loud Snoring   Napping during the day   Witnessed stopping of breathing   HTN  BMI over 35   Age over 51 years  Neck size over 40 CM  Male gender    Encouraged to have sleep study  Wants to loose weight    Eyes:        No unusual vision changes   Respiratory:          Cough with clear  phlegm , attributes to sinus problem   No change in color , consistency, amount of phlegm   No Hemoptysis   Cardiovascular:        As noted   Gastrointestinal:        Bowels- Regular  No overt GI/ blood losses   Endocrine:        Prednisone use > 20 mg daily for 3 weeks- None   Genitourinary: Negative for dysuria.        No urinary hesitancy    Musculoskeletal:        As above      Skin: Negative for rash.   Neurological: Negative for syncope.        No unilateral weakness   Hematological:        Current use of Anticoagulants  Antiplatelet agents  None   Psychiatric/Behavioral:        No Depression,Anxiety       No vascular stenting   No past medical history pertinent negatives.    Past Surgical History:   Procedure Laterality Date    KNEE CARTILAGE SURGERY         No anesthesia, bleeding, cardiac problems, PONV with previous surgeries/procedures.  Medications and  "Allergies reviewed in epic.   FH- No anesthesia,bleeding / venous thrombosis , early onset heart disease in family   Lives with  family, who can help     Physical Exam  Blood pressure 114/75, pulse 77, temperature 98.1 °F (36.7 °C), temperature source Oral, height 6' 2" (1.88 m), weight 124.7 kg (275 lb), SpO2 96 %.      Physical Exam  Constitutional- Vitals - Body mass index is 35.31 kg/m².,   Vitals:    09/19/18 1310   BP: 114/75   Pulse: 77   Temp: 98.1 °F (36.7 °C)     General appearance-Conscious,Coherent  Eyes- No conjunctival icterus,pupils  round  and reactive to light   ENT-Oral cavity- moist  , Hearing grossly normal   Neck- No thyromegaly ,Trachea -central, No jugular venous distension,   No Carotid Bruit   Cardiovascular -Heart Sounds- Normal  and  no murmur   , No gallop rhythm   Respiratory - Normal Respiratory Effort, Normal breath sounds,  no wheeze  and  no forced expiratory wheeze    Peripheral pitting pedal edema-- none , no calf pain   Gastrointestinal -Soft abdomen, No palpable masses, Non Tender,Liver,Spleen not palpable. No-- free fluid and shifting dullness  Musculoskeletal- No finger Clubbing. Strength grossly normal   Lymphatic-No Palpable cervical, axillary,Inguinal lymphadenopathy   Psychiatric - normal effect,Orientation  Rt Dorsalis pedis pulses-palpable    Lt Dorsalis pedis pulses- palpable   Rt Posterior tibial pulses -palpable   Left posterior tibial pulses -palpable   Miscellaneous -  no asterixis,  no dupuytren's contracture,  no renal bruit and  tattoos  Investigations  Lab and Imaging have been reviewed in [facility].    Review of Medicine tests    EKG- I had independently reviewed the EKG from--9/19/2018   Report pending     Review of clinical lab tests:  Lab Results   Component Value Date    CREATININE 1.4 08/16/2018    HGB 15.0 08/16/2018     08/16/2018     CXR - 8/16/2018- No significant abnormality        Review of old records- Was done and information gathered regards to " events leading to surgery and health conditions of significance in the perioperative period.    Outpatient Subjective & Objective

## 2018-09-19 NOTE — PROGRESS NOTES
Urology - [facility]    Staff: Keven Vivar MD    SUBJECTIVE:     Chief Complaint: right renal mass    History of Present Illness:  Jesu Reynolds is a 53 y.o. male who presents today for evaluation and management of a right renal mass.    He underwent CT scan 3-4 weeks ago left flank pain which demonstrated large right renal mass.   He denies gross hematuria or recent weight loss. Remote history of smoking but quit 20 years ago. No family history of kidney or bladder cancer. His father did have prostate cancer in his 60s.     He has HTN and arthritis. He has had knee surgery before but no prior abdominal surgeries.     Review of Systems    Review of patient's allergies indicates:  No Known Allergies  Past Medical History:   Diagnosis Date    Arthritis     Disorder of kidney and ureter     Gout     Hypertension     Prostatitis age 30's     Past Surgical History:   Procedure Laterality Date    KNEE CARTILAGE SURGERY Right     5      Family History   Problem Relation Age of Onset    Cancer Father     Prostate cancer Father     Cancer Mother     Heart disease Mother      Social History     Tobacco Use    Smoking status: Former Smoker     Packs/day: 0.50     Years: 6.00     Pack years: 3.00     Last attempt to quit:      Years since quittin.7    Smokeless tobacco: Never Used   Substance Use Topics    Alcohol use: No     Frequency: Never    Drug use: No      Current Outpatient Medications on File Prior to Visit   Medication Sig Dispense Refill    allopurinol (ZYLOPRIM) 100 MG tablet Take 100 mg by mouth every morning.       azilsartan med-chlorthalidone (EDARBYCLOR) 40-25 mg Tab Take by mouth every evening.        No current facility-administered medications on file prior to visit.             OBJECTIVE:     Anticoagulation:  No    Vital Signs (Most Recent)       Estimated body mass index is 35.31 kg/m² as calculated from the following:    Height as of an earlier encounter on 18:  "6' 2" (1.88 m).    Weight as of an earlier encounter on 9/19/18: 124.7 kg (275 lb).    Physical Exam   Nursing note and vitals reviewed.  Constitutional: He is oriented to person, place, and time. He appears well-developed and well-nourished. No distress.   Cardiovascular: Normal rate and intact distal pulses.    No swelling or varicosities   Pulmonary/Chest: Effort normal. No accessory muscle usage. No respiratory distress.   Abdominal: Soft. He exhibits no distension and no mass. There is no tenderness. There is no CVA tenderness. No hernia.   There is no spleen or liver enlargement   Musculoskeletal: He exhibits no edema or tenderness.   Neurological: He is alert and oriented to person, place, and time.   Skin: Skin is warm and dry. No lesion and no rash noted. No cyanosis.     Psychiatric: He has a normal mood and affect.     Labs:    BMP  Lab Results   Component Value Date     08/16/2018    K 4.3 08/16/2018     08/16/2018    CO2 26 08/16/2018    BUN 10 08/16/2018    CREATININE 1.4 08/16/2018    CALCIUM 11.0 (H) 08/16/2018    ANIONGAP 9 08/16/2018    ESTGFRAFRICA >60.0 08/16/2018    EGFRNONAA 57.4 (A) 08/16/2018       CBC  Lab Results   Component Value Date    WBC 4.67 08/16/2018    HGB 15.0 08/16/2018    HCT 45.0 08/16/2018    MCV 86 08/16/2018     08/16/2018     Urine dipstick showed no leukocytes, no nitrite, and no blood.    Imaging:  No imaging available in our system. Per radiology report and staff review of imaging:  Lesion measures 9.3 x 7.2 x 7.3 cm. Single artery, single vein, no thrombus, no nodes. Lower pole lesion that extends deeply into hilum. No suggestion on collecting system origin on CT scan.    ASSESSMENT     1. Right renal mass      PLAN:     1. To OR 9/24/2018 for laparoscopic right nephrectomy possible open  2. I have explained the indication, risks, benefits, and alternatives of the procedure in detail. The patient voices understanding and all questions have been " answered.  The patient agrees to proceed as planned.  3. Consent was obtained and signed.  4. Heparin pre-op    Dave Martinez MD

## 2018-09-19 NOTE — ASSESSMENT & PLAN NOTE
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

## 2018-09-20 PROBLEM — K76.0 FATTY LIVER: Status: ACTIVE | Noted: 2018-09-20

## 2018-09-24 ENCOUNTER — HOSPITAL ENCOUNTER (INPATIENT)
Facility: HOSPITAL | Age: 53
LOS: 1 days | Discharge: HOME | DRG: 661 | End: 2018-09-25
Attending: UROLOGY | Admitting: UROLOGY
Payer: COMMERCIAL

## 2018-09-24 ENCOUNTER — ANESTHESIA (OUTPATIENT)
Dept: SURGERY | Facility: HOSPITAL | Age: 53
DRG: 661 | End: 2018-09-24
Payer: COMMERCIAL

## 2018-09-24 DIAGNOSIS — N28.89 RIGHT RENAL MASS: Primary | ICD-10-CM

## 2018-09-24 LAB
GLUCOSE SERPL-MCNC: 131 MG/DL (ref 70–110)
HCO3 UR-SCNC: 22.2 MMOL/L (ref 24–28)
HCT VFR BLD CALC: 35 %PCV (ref 36–54)
PCO2 BLDA: 37.4 MMHG (ref 35–45)
PH SMN: 7.38 [PH] (ref 7.35–7.45)
PO2 BLDA: 113 MMHG (ref 80–100)
POC BE: -3 MMOL/L
POC IONIZED CALCIUM: 1.28 MMOL/L (ref 1.06–1.42)
POC SATURATED O2: 98 % (ref 95–100)
POC TCO2: 23 MMOL/L (ref 23–27)
POTASSIUM BLD-SCNC: 3 MMOL/L (ref 3.5–5.1)
SAMPLE: ABNORMAL
SODIUM BLD-SCNC: 142 MMOL/L (ref 136–145)

## 2018-09-24 PROCEDURE — 12000002 HC ACUTE/MED SURGE SEMI-PRIVATE ROOM

## 2018-09-24 PROCEDURE — 63600175 PHARM REV CODE 636 W HCPCS: Performed by: STUDENT IN AN ORGANIZED HEALTH CARE EDUCATION/TRAINING PROGRAM

## 2018-09-24 PROCEDURE — 25000003 PHARM REV CODE 250: Performed by: NURSE ANESTHETIST, CERTIFIED REGISTERED

## 2018-09-24 PROCEDURE — 94761 N-INVAS EAR/PLS OXIMETRY MLT: CPT

## 2018-09-24 PROCEDURE — 76942 ECHO GUIDE FOR BIOPSY: CPT | Performed by: ANESTHESIOLOGY

## 2018-09-24 PROCEDURE — 88307 TISSUE EXAM BY PATHOLOGIST: CPT | Performed by: PATHOLOGY

## 2018-09-24 PROCEDURE — 71000039 HC RECOVERY, EACH ADD'L HOUR: Performed by: UROLOGY

## 2018-09-24 PROCEDURE — 50545 LAPARO RADICAL NEPHRECTOMY: CPT | Mod: RT,,, | Performed by: UROLOGY

## 2018-09-24 PROCEDURE — D9220A PRA ANESTHESIA: Mod: CRNA,,, | Performed by: NURSE ANESTHETIST, CERTIFIED REGISTERED

## 2018-09-24 PROCEDURE — 27201037 HC PRESSURE MONITORING SET UP

## 2018-09-24 PROCEDURE — 88307 TISSUE EXAM BY PATHOLOGIST: CPT | Mod: 26,,, | Performed by: PATHOLOGY

## 2018-09-24 PROCEDURE — 37000008 HC ANESTHESIA 1ST 15 MINUTES: Performed by: UROLOGY

## 2018-09-24 PROCEDURE — 37000009 HC ANESTHESIA EA ADD 15 MINS: Performed by: UROLOGY

## 2018-09-24 PROCEDURE — 63600175 PHARM REV CODE 636 W HCPCS: Performed by: ANESTHESIOLOGY

## 2018-09-24 PROCEDURE — 86920 COMPATIBILITY TEST SPIN: CPT

## 2018-09-24 PROCEDURE — 27000221 HC OXYGEN, UP TO 24 HOURS

## 2018-09-24 PROCEDURE — P9045 ALBUMIN (HUMAN), 5%, 250 ML: HCPCS | Mod: JG | Performed by: NURSE ANESTHETIST, CERTIFIED REGISTERED

## 2018-09-24 PROCEDURE — 25000003 PHARM REV CODE 250: Performed by: STUDENT IN AN ORGANIZED HEALTH CARE EDUCATION/TRAINING PROGRAM

## 2018-09-24 PROCEDURE — 0TT04ZZ RESECTION OF RIGHT KIDNEY, PERCUTANEOUS ENDOSCOPIC APPROACH: ICD-10-PCS | Performed by: UROLOGY

## 2018-09-24 PROCEDURE — 64463 PVB THORACIC CONT INFUSION: CPT | Mod: 50,59,, | Performed by: ANESTHESIOLOGY

## 2018-09-24 PROCEDURE — 25000003 PHARM REV CODE 250: Performed by: UROLOGY

## 2018-09-24 PROCEDURE — 27201423 OPTIME MED/SURG SUP & DEVICES STERILE SUPPLY: Performed by: UROLOGY

## 2018-09-24 PROCEDURE — D9220A PRA ANESTHESIA: Mod: ANES,,, | Performed by: ANESTHESIOLOGY

## 2018-09-24 PROCEDURE — 63600175 PHARM REV CODE 636 W HCPCS: Performed by: NURSE ANESTHETIST, CERTIFIED REGISTERED

## 2018-09-24 PROCEDURE — S0020 INJECTION, BUPIVICAINE HYDRO: HCPCS | Performed by: UROLOGY

## 2018-09-24 PROCEDURE — 36000711: Performed by: UROLOGY

## 2018-09-24 PROCEDURE — 63600175 PHARM REV CODE 636 W HCPCS

## 2018-09-24 PROCEDURE — 36000710: Performed by: UROLOGY

## 2018-09-24 PROCEDURE — 71000033 HC RECOVERY, INTIAL HOUR: Performed by: UROLOGY

## 2018-09-24 PROCEDURE — 76942 ECHO GUIDE FOR BIOPSY: CPT | Performed by: STUDENT IN AN ORGANIZED HEALTH CARE EDUCATION/TRAINING PROGRAM

## 2018-09-24 RX ORDER — MIDAZOLAM HYDROCHLORIDE 1 MG/ML
0.5 INJECTION INTRAMUSCULAR; INTRAVENOUS
Status: DISCONTINUED | OUTPATIENT
Start: 2018-09-24 | End: 2018-09-24

## 2018-09-24 RX ORDER — HEPARIN SODIUM 5000 [USP'U]/ML
5000 INJECTION, SOLUTION INTRAVENOUS; SUBCUTANEOUS
Status: COMPLETED | OUTPATIENT
Start: 2018-09-24 | End: 2018-09-24

## 2018-09-24 RX ORDER — KETAMINE HCL IN 0.9 % NACL 50 MG/5 ML
SYRINGE (ML) INTRAVENOUS
Status: DISCONTINUED | OUTPATIENT
Start: 2018-09-24 | End: 2018-09-24

## 2018-09-24 RX ORDER — EPHEDRINE SULFATE 50 MG/ML
INJECTION, SOLUTION INTRAVENOUS
Status: DISCONTINUED | OUTPATIENT
Start: 2018-09-24 | End: 2018-09-24

## 2018-09-24 RX ORDER — PREGABALIN 75 MG/1
75 CAPSULE ORAL 2 TIMES DAILY
Status: DISCONTINUED | OUTPATIENT
Start: 2018-09-24 | End: 2018-09-25 | Stop reason: HOSPADM

## 2018-09-24 RX ORDER — ACETAMINOPHEN 10 MG/ML
1000 INJECTION, SOLUTION INTRAVENOUS
Status: COMPLETED | OUTPATIENT
Start: 2018-09-24 | End: 2018-09-24

## 2018-09-24 RX ORDER — GLYCOPYRROLATE 0.2 MG/ML
INJECTION INTRAMUSCULAR; INTRAVENOUS
Status: DISCONTINUED | OUTPATIENT
Start: 2018-09-24 | End: 2018-09-24

## 2018-09-24 RX ORDER — OXYCODONE HYDROCHLORIDE 5 MG/1
5 TABLET ORAL EVERY 4 HOURS PRN
Status: DISCONTINUED | OUTPATIENT
Start: 2018-09-24 | End: 2018-09-25 | Stop reason: HOSPADM

## 2018-09-24 RX ORDER — ACETAMINOPHEN 10 MG/ML
1000 INJECTION, SOLUTION INTRAVENOUS EVERY 8 HOURS
Status: DISCONTINUED | OUTPATIENT
Start: 2018-09-24 | End: 2018-09-25 | Stop reason: HOSPADM

## 2018-09-24 RX ORDER — ALLOPURINOL 100 MG/1
100 TABLET ORAL EVERY MORNING
Status: DISCONTINUED | OUTPATIENT
Start: 2018-09-24 | End: 2018-09-25 | Stop reason: HOSPADM

## 2018-09-24 RX ORDER — DEXAMETHASONE SODIUM PHOSPHATE 4 MG/ML
INJECTION, SOLUTION INTRA-ARTICULAR; INTRALESIONAL; INTRAMUSCULAR; INTRAVENOUS; SOFT TISSUE
Status: DISCONTINUED | OUTPATIENT
Start: 2018-09-24 | End: 2018-09-24

## 2018-09-24 RX ORDER — PROPOFOL 10 MG/ML
VIAL (ML) INTRAVENOUS
Status: DISCONTINUED | OUTPATIENT
Start: 2018-09-24 | End: 2018-09-24

## 2018-09-24 RX ORDER — HALOPERIDOL 5 MG/ML
INJECTION INTRAMUSCULAR
Status: COMPLETED
Start: 2018-09-24 | End: 2018-09-24

## 2018-09-24 RX ORDER — ROPIVACAINE HYDROCHLORIDE 2 MG/ML
8 INJECTION, SOLUTION EPIDURAL; INFILTRATION; PERINEURAL CONTINUOUS
Status: DISCONTINUED | OUTPATIENT
Start: 2018-09-24 | End: 2018-09-24

## 2018-09-24 RX ORDER — SODIUM CHLORIDE 0.9 % (FLUSH) 0.9 %
3 SYRINGE (ML) INJECTION
Status: DISCONTINUED | OUTPATIENT
Start: 2018-09-24 | End: 2018-09-25 | Stop reason: HOSPADM

## 2018-09-24 RX ORDER — ONDANSETRON 2 MG/ML
4 INJECTION INTRAMUSCULAR; INTRAVENOUS EVERY 6 HOURS PRN
Status: DISCONTINUED | OUTPATIENT
Start: 2018-09-24 | End: 2018-09-25 | Stop reason: HOSPADM

## 2018-09-24 RX ORDER — CEFAZOLIN SODIUM 1 G/3ML
2 INJECTION, POWDER, FOR SOLUTION INTRAMUSCULAR; INTRAVENOUS
Status: COMPLETED | OUTPATIENT
Start: 2018-09-24 | End: 2018-09-24

## 2018-09-24 RX ORDER — ACETAMINOPHEN 500 MG
1000 TABLET ORAL EVERY 6 HOURS
Status: DISCONTINUED | OUTPATIENT
Start: 2018-09-25 | End: 2018-09-25 | Stop reason: HOSPADM

## 2018-09-24 RX ORDER — ROPIVACAINE HYDROCHLORIDE 2 MG/ML
10 INJECTION, SOLUTION EPIDURAL; INFILTRATION; PERINEURAL CONTINUOUS
Status: DISCONTINUED | OUTPATIENT
Start: 2018-09-24 | End: 2018-09-25 | Stop reason: HOSPADM

## 2018-09-24 RX ORDER — HALOPERIDOL 5 MG/ML
0.5 INJECTION INTRAMUSCULAR ONCE
Status: COMPLETED | OUTPATIENT
Start: 2018-09-24 | End: 2018-09-24

## 2018-09-24 RX ORDER — HEPARIN SODIUM 5000 [USP'U]/ML
5000 INJECTION, SOLUTION INTRAVENOUS; SUBCUTANEOUS EVERY 8 HOURS
Status: DISCONTINUED | OUTPATIENT
Start: 2018-09-24 | End: 2018-09-25 | Stop reason: HOSPADM

## 2018-09-24 RX ORDER — FENTANYL CITRATE 50 UG/ML
INJECTION, SOLUTION INTRAMUSCULAR; INTRAVENOUS
Status: DISCONTINUED | OUTPATIENT
Start: 2018-09-24 | End: 2018-09-24

## 2018-09-24 RX ORDER — METHOCARBAMOL 500 MG/1
500 TABLET, FILM COATED ORAL 4 TIMES DAILY
Status: DISCONTINUED | OUTPATIENT
Start: 2018-09-24 | End: 2018-09-25 | Stop reason: HOSPADM

## 2018-09-24 RX ORDER — LIDOCAINE HCL/PF 100 MG/5ML
SYRINGE (ML) INTRAVENOUS
Status: DISCONTINUED | OUTPATIENT
Start: 2018-09-24 | End: 2018-09-24

## 2018-09-24 RX ORDER — LIDOCAINE HYDROCHLORIDE 10 MG/ML
1 INJECTION, SOLUTION EPIDURAL; INFILTRATION; INTRACAUDAL; PERINEURAL ONCE
Status: COMPLETED | OUTPATIENT
Start: 2018-09-24 | End: 2018-09-24

## 2018-09-24 RX ORDER — HYDROCODONE BITARTRATE AND ACETAMINOPHEN 500; 5 MG/1; MG/1
TABLET ORAL
Status: DISCONTINUED | OUTPATIENT
Start: 2018-09-24 | End: 2018-09-25 | Stop reason: HOSPADM

## 2018-09-24 RX ORDER — PHENYLEPHRINE HYDROCHLORIDE 10 MG/ML
INJECTION INTRAVENOUS
Status: DISCONTINUED | OUTPATIENT
Start: 2018-09-24 | End: 2018-09-24

## 2018-09-24 RX ORDER — ROCURONIUM BROMIDE 10 MG/ML
INJECTION, SOLUTION INTRAVENOUS
Status: DISCONTINUED | OUTPATIENT
Start: 2018-09-24 | End: 2018-09-24

## 2018-09-24 RX ORDER — ONDANSETRON 2 MG/ML
4 INJECTION INTRAMUSCULAR; INTRAVENOUS EVERY 12 HOURS PRN
Status: DISCONTINUED | OUTPATIENT
Start: 2018-09-24 | End: 2018-09-24

## 2018-09-24 RX ORDER — ONDANSETRON 2 MG/ML
INJECTION INTRAMUSCULAR; INTRAVENOUS
Status: DISCONTINUED | OUTPATIENT
Start: 2018-09-24 | End: 2018-09-24

## 2018-09-24 RX ORDER — PANTOPRAZOLE SODIUM 40 MG/1
40 TABLET, DELAYED RELEASE ORAL DAILY
Status: DISCONTINUED | OUTPATIENT
Start: 2018-09-24 | End: 2018-09-25 | Stop reason: HOSPADM

## 2018-09-24 RX ORDER — POLYETHYLENE GLYCOL 3350 17 G/17G
17 POWDER, FOR SOLUTION ORAL DAILY
Status: DISCONTINUED | OUTPATIENT
Start: 2018-09-24 | End: 2018-09-25 | Stop reason: HOSPADM

## 2018-09-24 RX ORDER — BUPIVACAINE HYDROCHLORIDE 5 MG/ML
INJECTION, SOLUTION EPIDURAL; INTRACAUDAL
Status: DISCONTINUED | OUTPATIENT
Start: 2018-09-24 | End: 2018-09-24 | Stop reason: HOSPADM

## 2018-09-24 RX ORDER — ACETAMINOPHEN 10 MG/ML
1000 INJECTION, SOLUTION INTRAVENOUS ONCE
Status: COMPLETED | OUTPATIENT
Start: 2018-09-24 | End: 2018-09-24

## 2018-09-24 RX ORDER — FENTANYL CITRATE 50 UG/ML
25 INJECTION, SOLUTION INTRAMUSCULAR; INTRAVENOUS EVERY 5 MIN PRN
Status: COMPLETED | OUTPATIENT
Start: 2018-09-24 | End: 2018-09-24

## 2018-09-24 RX ORDER — ALBUMIN HUMAN 50 G/1000ML
SOLUTION INTRAVENOUS CONTINUOUS PRN
Status: DISCONTINUED | OUTPATIENT
Start: 2018-09-24 | End: 2018-09-24

## 2018-09-24 RX ORDER — SODIUM CHLORIDE 9 MG/ML
INJECTION, SOLUTION INTRAVENOUS CONTINUOUS
Status: DISCONTINUED | OUTPATIENT
Start: 2018-09-24 | End: 2018-09-24

## 2018-09-24 RX ORDER — SODIUM CHLORIDE 9 MG/ML
INJECTION, SOLUTION INTRAVENOUS CONTINUOUS
Status: DISCONTINUED | OUTPATIENT
Start: 2018-09-24 | End: 2018-09-25

## 2018-09-24 RX ORDER — OXYCODONE HYDROCHLORIDE 10 MG/1
10 TABLET ORAL EVERY 4 HOURS PRN
Status: DISCONTINUED | OUTPATIENT
Start: 2018-09-24 | End: 2018-09-25 | Stop reason: HOSPADM

## 2018-09-24 RX ADMIN — ONDANSETRON 4 MG: 2 INJECTION INTRAMUSCULAR; INTRAVENOUS at 01:09

## 2018-09-24 RX ADMIN — ROCURONIUM BROMIDE 5 MG: 10 INJECTION, SOLUTION INTRAVENOUS at 11:09

## 2018-09-24 RX ADMIN — ROCURONIUM BROMIDE 45 MG: 10 INJECTION, SOLUTION INTRAVENOUS at 11:09

## 2018-09-24 RX ADMIN — HEPARIN SODIUM 5000 UNITS: 5000 INJECTION, SOLUTION INTRAVENOUS; SUBCUTANEOUS at 09:09

## 2018-09-24 RX ADMIN — PROPOFOL 200 MG: 10 INJECTION, EMULSION INTRAVENOUS at 11:09

## 2018-09-24 RX ADMIN — METHOCARBAMOL 500 MG: 500 TABLET ORAL at 09:09

## 2018-09-24 RX ADMIN — Medication 10 MG: at 12:09

## 2018-09-24 RX ADMIN — Medication 20 MG: at 12:09

## 2018-09-24 RX ADMIN — ROCURONIUM BROMIDE 20 MG: 10 INJECTION, SOLUTION INTRAVENOUS at 01:09

## 2018-09-24 RX ADMIN — FENTANYL CITRATE 50 MCG: 50 INJECTION, SOLUTION INTRAMUSCULAR; INTRAVENOUS at 11:09

## 2018-09-24 RX ADMIN — EPHEDRINE SULFATE 5 MG: 50 INJECTION, SOLUTION INTRAMUSCULAR; INTRAVENOUS; SUBCUTANEOUS at 01:09

## 2018-09-24 RX ADMIN — PHENYLEPHRINE HYDROCHLORIDE 100 MCG: 10 INJECTION INTRAVENOUS at 11:09

## 2018-09-24 RX ADMIN — ACETAMINOPHEN 1000 MG: 10 INJECTION, SOLUTION INTRAVENOUS at 09:09

## 2018-09-24 RX ADMIN — DEXAMETHASONE SODIUM PHOSPHATE 4 MG: 4 INJECTION, SOLUTION INTRAMUSCULAR; INTRAVENOUS at 11:09

## 2018-09-24 RX ADMIN — ROCURONIUM BROMIDE 20 MG: 10 INJECTION, SOLUTION INTRAVENOUS at 11:09

## 2018-09-24 RX ADMIN — PHENYLEPHRINE HYDROCHLORIDE 100 MCG: 10 INJECTION INTRAVENOUS at 12:09

## 2018-09-24 RX ADMIN — ALBUMIN (HUMAN): 12.5 SOLUTION INTRAVENOUS at 01:09

## 2018-09-24 RX ADMIN — GLYCOPYRROLATE 0.2 MG: 0.2 INJECTION, SOLUTION INTRAMUSCULAR; INTRAVENOUS at 11:09

## 2018-09-24 RX ADMIN — ACETAMINOPHEN 1000 MG: 10 INJECTION, SOLUTION INTRAVENOUS at 03:09

## 2018-09-24 RX ADMIN — HEPARIN SODIUM 5000 UNITS: 5000 INJECTION, SOLUTION INTRAVENOUS; SUBCUTANEOUS at 03:09

## 2018-09-24 RX ADMIN — ROPIVACAINE HYDROCHLORIDE 10 ML/HR: 2 INJECTION, SOLUTION EPIDURAL; INFILTRATION at 02:09

## 2018-09-24 RX ADMIN — KETAMINE HYDROCHLORIDE 20 MG/HR: 50 INJECTION INTRAMUSCULAR; INTRAVENOUS at 12:09

## 2018-09-24 RX ADMIN — EPHEDRINE SULFATE 5 MG: 50 INJECTION, SOLUTION INTRAMUSCULAR; INTRAVENOUS; SUBCUTANEOUS at 12:09

## 2018-09-24 RX ADMIN — SODIUM CHLORIDE 0.25 MCG/KG/MIN: 9 INJECTION, SOLUTION INTRAVENOUS at 12:09

## 2018-09-24 RX ADMIN — FENTANYL CITRATE 25 MCG: 50 INJECTION INTRAMUSCULAR; INTRAVENOUS at 03:09

## 2018-09-24 RX ADMIN — HALOPERIDOL 0.5 MG: 5 INJECTION INTRAMUSCULAR at 04:09

## 2018-09-24 RX ADMIN — ROPIVACAINE HYDROCHLORIDE 10 ML/HR: 2 INJECTION, SOLUTION EPIDURAL; INFILTRATION at 11:09

## 2018-09-24 RX ADMIN — FENTANYL CITRATE 50 MCG: 50 INJECTION INTRAMUSCULAR; INTRAVENOUS at 10:09

## 2018-09-24 RX ADMIN — SODIUM CHLORIDE: 0.9 INJECTION, SOLUTION INTRAVENOUS at 09:09

## 2018-09-24 RX ADMIN — FENTANYL CITRATE 50 MCG: 50 INJECTION, SOLUTION INTRAMUSCULAR; INTRAVENOUS at 01:09

## 2018-09-24 RX ADMIN — LIDOCAINE HYDROCHLORIDE 10 MG: 10 INJECTION, SOLUTION EPIDURAL; INFILTRATION; INTRACAUDAL at 09:09

## 2018-09-24 RX ADMIN — ACETAMINOPHEN 1000 MG: 500 TABLET ORAL at 11:09

## 2018-09-24 RX ADMIN — ROCURONIUM BROMIDE 10 MG: 10 INJECTION, SOLUTION INTRAVENOUS at 12:09

## 2018-09-24 RX ADMIN — CEFAZOLIN 3 G: 330 INJECTION, POWDER, FOR SOLUTION INTRAMUSCULAR; INTRAVENOUS at 11:09

## 2018-09-24 RX ADMIN — PANTOPRAZOLE SODIUM 40 MG: 40 TABLET, DELAYED RELEASE ORAL at 03:09

## 2018-09-24 RX ADMIN — HALOPERIDOL LACTATE 0.5 MG: 5 INJECTION, SOLUTION INTRAMUSCULAR at 04:09

## 2018-09-24 RX ADMIN — ALLOPURINOL 100 MG: 100 TABLET ORAL at 04:09

## 2018-09-24 RX ADMIN — MIDAZOLAM HYDROCHLORIDE 1 MG: 1 INJECTION, SOLUTION INTRAMUSCULAR; INTRAVENOUS at 10:09

## 2018-09-24 RX ADMIN — Medication 20 MG: at 11:09

## 2018-09-24 RX ADMIN — SODIUM CHLORIDE, SODIUM GLUCONATE, SODIUM ACETATE, POTASSIUM CHLORIDE, MAGNESIUM CHLORIDE, SODIUM PHOSPHATE, DIBASIC, AND POTASSIUM PHOSPHATE: .53; .5; .37; .037; .03; .012; .00082 INJECTION, SOLUTION INTRAVENOUS at 01:09

## 2018-09-24 RX ADMIN — PHENYLEPHRINE HYDROCHLORIDE 100 MCG: 10 INJECTION INTRAVENOUS at 01:09

## 2018-09-24 RX ADMIN — SODIUM CHLORIDE, SODIUM GLUCONATE, SODIUM ACETATE, POTASSIUM CHLORIDE, MAGNESIUM CHLORIDE, SODIUM PHOSPHATE, DIBASIC, AND POTASSIUM PHOSPHATE: .53; .5; .37; .037; .03; .012; .00082 INJECTION, SOLUTION INTRAVENOUS at 12:09

## 2018-09-24 RX ADMIN — SODIUM CHLORIDE, SODIUM GLUCONATE, SODIUM ACETATE, POTASSIUM CHLORIDE, MAGNESIUM CHLORIDE, SODIUM PHOSPHATE, DIBASIC, AND POTASSIUM PHOSPHATE: .53; .5; .37; .037; .03; .012; .00082 INJECTION, SOLUTION INTRAVENOUS at 11:09

## 2018-09-24 RX ADMIN — LIDOCAINE HYDROCHLORIDE 100 MG: 20 INJECTION, SOLUTION INTRAVENOUS at 11:09

## 2018-09-24 RX ADMIN — SODIUM CHLORIDE: 0.9 INJECTION, SOLUTION INTRAVENOUS at 02:09

## 2018-09-24 RX ADMIN — ONDANSETRON HYDROCHLORIDE 4 MG: 2 INJECTION, SOLUTION INTRAMUSCULAR; INTRAVENOUS at 03:09

## 2018-09-24 RX ADMIN — PREGABALIN 75 MG: 75 CAPSULE ORAL at 09:09

## 2018-09-24 RX ADMIN — SUGAMMADEX 300 MG: 100 INJECTION, SOLUTION INTRAVENOUS at 02:09

## 2018-09-24 RX ADMIN — METHOCARBAMOL 500 MG: 500 TABLET ORAL at 05:09

## 2018-09-24 RX ADMIN — OXYCODONE HYDROCHLORIDE 10 MG: 10 TABLET ORAL at 04:09

## 2018-09-24 RX ADMIN — ROCURONIUM BROMIDE 20 MG: 10 INJECTION, SOLUTION INTRAVENOUS at 12:09

## 2018-09-24 NOTE — ANESTHESIA POSTPROCEDURE EVALUATION
"Anesthesia Post Evaluation    Patient: Jesu Reynolds    Procedure(s) Performed: Procedure(s) (LRB):  NEPHRECTOMY, LAPAROSCOPIC/ POSS OPEN (Right)    Final Anesthesia Type: general  Patient location during evaluation: PACU  Patient participation: Yes- Able to Participate  Level of consciousness: awake and alert  Post-procedure vital signs: reviewed and stable  Pain management: adequate  Airway patency: patent  PONV status at discharge: No PONV  Anesthetic complications: no      Cardiovascular status: blood pressure returned to baseline  Respiratory status: unassisted  Hydration status: euvolemic  Follow-up not needed.        Visit Vitals  BP (!) 158/85 (BP Location: Right arm, Patient Position: Lying)   Pulse 82   Temp 36.7 °C (98 °F) (Oral)   Resp 16   Ht 6' 2" (1.88 m)   Wt 130 kg (286 lb 9.6 oz)   SpO2 97%   BMI 36.80 kg/m²       Pain/Daina Score: Pain Assessment Performed: Yes (9/24/2018  5:00 PM)  Presence of Pain: complains of pain/discomfort (9/24/2018  4:04 PM)  Pain Rating Prior to Med Admin: 7 (9/24/2018  4:04 PM)  Pain Rating Post Med Admin: 2 (9/24/2018  5:00 PM)  Daina Score: 10 (9/24/2018  5:00 PM)        "

## 2018-09-24 NOTE — PLAN OF CARE
Plan of care and periop routine discussed with patient. Pt verbalized understanding. All questions answered. VSS. Respirations even and unlabored. Pt reports surgical pain is tolerable. Family has been updated. Will continue to monitor. '

## 2018-09-24 NOTE — PLAN OF CARE
Problem: Patient Care Overview  Goal: Plan of Care Review  Outcome: Ongoing (interventions implemented as appropriate)  Continue with POC, patient stable at this time.

## 2018-09-24 NOTE — PLAN OF CARE
Pre-op questions answered. Pt oriented to room, call light within reach. Family at bedside, instructed to take all patient belongings when leaving pre-op room Instructed to call with questions or concerns. Will continue to monitor.

## 2018-09-24 NOTE — TRANSFER OF CARE
"Anesthesia Transfer of Care Note    Patient: Jesu Reynolds    Procedure(s) Performed: Procedure(s) (LRB):  NEPHRECTOMY, LAPAROSCOPIC/ POSS OPEN (Right)    Patient location: PACU    Anesthesia Type: general    Transport from OR: Transported from OR on 6-10 L/min O2 by face mask with adequate spontaneous ventilation    Post pain: adequate analgesia    Post assessment: no apparent anesthetic complications and tolerated procedure well    Post vital signs: stable    Level of consciousness: sedated    Nausea/Vomiting: no nausea/vomiting    Complications: none    Transfer of care protocol was followed      Last vitals:   Visit Vitals  /80 (BP Location: Left arm, Patient Position: Lying)   Pulse 81   Temp 36.4 °C (97.5 °F) (Oral)   Resp 20   Ht 6' 2" (1.88 m)   Wt 124.7 kg (275 lb)   SpO2 100%   BMI 35.31 kg/m²     "

## 2018-09-24 NOTE — ANESTHESIA PROCEDURE NOTES
Erector Spinae Plane Continuous Catheter    Patient location during procedure: pre-op   Block not for primary anesthetic.  Reason for block: at surgeon's request and post-op pain management   Post-op Pain Location: abdominal pain  Start time: 9/24/2018 10:30 AM  Timeout: 9/24/2018 10:30 AM   End time: 9/24/2018 10:50 AM  Staffing  Anesthesiologist: Tomeka Aguilar MD  Resident/CRNA: Antony Lovell Jr., MD  Performed: resident/CRNA   Preanesthetic Checklist  Completed: patient identified, site marked, surgical consent, pre-op evaluation, timeout performed, IV checked, risks and benefits discussed and monitors and equipment checked  Peripheral Block  Patient position: sitting  Prep: ChloraPrep  Patient monitoring: heart rate, cardiac monitor, continuous pulse ox, continuous capnometry and frequent blood pressure checks  Block type: erector spinae plane (Erector Spinae Plane)  Laterality: bilateral  Injection technique: continuous  Needle  Needle type: Tuohy   Needle gauge: 17 G  Needle length: 3.5 in  Needle localization: anatomical landmarks and ultrasound guidance  Catheter type: spring wound  Catheter size: 19 G  Test dose: lidocaine 1.5% with Epi 1-to-200,000 and negative   -ultrasound image captured on disc.  Assessment  Injection assessment: negative aspiration, negative parasthesia and local visualized surrounding nerve  Paresthesia pain: none  Heart rate change: no  Slow fractionated injection: yes  Additional Notes  Bupivacaine 0.375% 30 cc administered bilaterally.    Patient tolerated very well.  Vital signs stable.  See [facility] RN charting for vital signs.

## 2018-09-24 NOTE — OP NOTE
[facility]  Operative Note    Date: 09/24/2018    Pre-Op Diagnosis:  Right renal mass suspicious for malignancy  Patient Active Problem List   Diagnosis    Essential hypertension    Gout    Arthritis    Class 2 obesity with body mass index (BMI) of 35.0 to 35.9 in adult    Tattoos    Snoring    Renal impairment    Hypercalcemia    Serum total bilirubin elevated    Right renal mass    Fatty liver         Post-Op Diagnosis: same    Procedure(s) Performed:   1.  Right laparoscopic nephrectomy      Specimen(s): Right kidney and proximal ureter    Staff Surgeon: Keven Vivar MD    Assistant Surgeon: Keven Braden MD; Miles Parish MD    Anesthesia:  General endotracheal anesthesia    Indications: Jesu Reynolds is a 53 y.o. male with a right renal mass, presenting for surgical management. This mass is 9.3x7.2x7.3 cm and suspicious for malignancy.  After discussion of risks and benefits of different options for treating this renal mass, the patient has elected to pursue surgical intervention.        Findings:    1.  Right lap nephrectomy performed in standard fashion without complications    Estimated Blood Loss: 75 mL    Drains:   1.  16 Fr moncada catheter    Procedure in Detail: After informed consent was obtained, the patient was brought to the operating suite and placed in the supine position. SCDs were applied and working. General anesthesia was administered. A moncada cathter was placed in the standard fashion. The patient was then placed in the lateral decubitus position with the right side up. The patient was appropriately padded and secured to the table. The patient was prepped and draped in the usual sterile fashion. Timeout was performed and preoperative antibiotics were confirmed.     A Veress needle was introduced into the abdomen. Entry into the peritoneal cavity was confirmed with the drop test and an initial insufflation pressure <10 mmHg. Aspiration during our drop  test revealed no blood or succus. The peritoneal cavity was insufflated up to 15 mmHg. The trocar sites were marked in the standard triangular fashion and we confirmed good placement visually. All incisions were made through the skin using a 15 blade. The Veress was removed. The Visiport with the 0-degree lens was introduced into the abdomen under direct vision. The abdomen was inspected and found to be free of bowel or vascular injury. 2 12 mm trocars were placed at the previously incised sites, each a handwidth from the camera port. An additional 5 mm trocar was introduced just below the xyphoid to act as a liver retractor. Each trocar was introduced under direct vision ensuring safe introduction into the peritoneal cavity.     A locking grasper was placed under the liver to act as a liver retractor through the 5 mm port. The dissection began along the white line of Toldt, reflecting the colon medially away from the kidney. The hepatic reflection was released. The psoas muscle was identified. Once the colon was reflected, dissection was carried out just below the kidney, and the ureter was identified.  The ureter was elevated and we continued our dissection toward the lower pole of the kidney.  We continued our dissection proximally until we identified the renal hilum.     There was a lower pole artery and vein that were identified and these were dissected carefully and then stapler was fired across them. Hemostasis was seen.      Careful dissection of the larger renal vein and artery was performed. The renal vein was identified followed by the renal artery.  The entire hilum (vein and artery) were carefully isolated and cleared from surrounding tissues.  The hilum was stapled across with a vascular load.  Hemostasis was observed.    The kidney was then released from its superior followed by lateral attachments.  The adrenal gland was spared.  Once the kidney was circumferentially freed, the proximal ureter was  grasped with a locking grasper.  1 hemo-lock clip was placed on the ureter and it was divided.     The most midline port was removed and the endocatch bag was placed through its incision.  The kidney was then placed in the bag under direct vision.  The camera was removed.  The skin and fascial incision was extended superiorly to accommodate the kidney.  The specimen was removed and passed off the field.       The extraction site fascia was closed with a running 0 looped PDS. The abdomen was reinsufflated and inspected with the laparoscope. The fascial closure was free of bowel.  The renal bed was irrigated.  Hemostasis was excellent.  The pneumoperitoneum was turned down and the hilum and resection bed inspected, confirming no active bleeding.      The trocars were removed under direct vision.  Local anesthetic was injected into the port sites.  All incisions were closed deeply using 2-0 vicryl in an interrupted fashion.  All skin incisions were re approximated using 4-0 monocryl in a running subcuticular fashion.  Dermabond was applied to all wounds.     The patient tolerated the procedure well and was transferred to the recovery room in stable condition.    Disposition:  The patient will be admitted overnight for observation.    Keven Braden MD

## 2018-09-24 NOTE — NURSING TRANSFER
Nursing Transfer Note      9/24/2018     Transfer To: 543b    Transfer via stretcher    Transfer with none    Transported by pct    Medicines sent: iv fluids and perineural drip x2     Chart send with patient: Yes    Notified: spouse    Patient reassessed at: 9/24/18 see flowsheets

## 2018-09-25 ENCOUNTER — ANESTHESIA EVENT (OUTPATIENT)
Dept: SURGERY | Facility: HOSPITAL | Age: 53
End: 2018-09-25

## 2018-09-25 ENCOUNTER — ANESTHESIA (OUTPATIENT)
Dept: SURGERY | Facility: HOSPITAL | Age: 53
End: 2018-09-25

## 2018-09-25 VITALS
DIASTOLIC BLOOD PRESSURE: 73 MMHG | RESPIRATION RATE: 18 BRPM | BODY MASS INDEX: 36.78 KG/M2 | HEIGHT: 74 IN | WEIGHT: 286.63 LBS | TEMPERATURE: 97 F | SYSTOLIC BLOOD PRESSURE: 135 MMHG | HEART RATE: 75 BPM | OXYGEN SATURATION: 96 %

## 2018-09-25 LAB
ANION GAP SERPL CALC-SCNC: 8 MMOL/L
BASOPHILS # BLD AUTO: 0.01 K/UL
BASOPHILS NFR BLD: 0.1 %
BUN SERPL-MCNC: 16 MG/DL
CALCIUM SERPL-MCNC: 10.9 MG/DL
CHLORIDE SERPL-SCNC: 106 MMOL/L
CO2 SERPL-SCNC: 26 MMOL/L
CREAT SERPL-MCNC: 2 MG/DL
DIFFERENTIAL METHOD: ABNORMAL
EOSINOPHIL # BLD AUTO: 0 K/UL
EOSINOPHIL NFR BLD: 0 %
ERYTHROCYTE [DISTWIDTH] IN BLOOD BY AUTOMATED COUNT: 13.6 %
EST. GFR  (AFRICAN AMERICAN): 43.1 ML/MIN/1.73 M^2
EST. GFR  (NON AFRICAN AMERICAN): 37.3 ML/MIN/1.73 M^2
GLUCOSE SERPL-MCNC: 104 MG/DL
HCT VFR BLD AUTO: 40.1 %
HGB BLD-MCNC: 13.9 G/DL
IMM GRANULOCYTES # BLD AUTO: 0.08 K/UL
IMM GRANULOCYTES NFR BLD AUTO: 0.6 %
LYMPHOCYTES # BLD AUTO: 1.3 K/UL
LYMPHOCYTES NFR BLD: 9.7 %
MCH RBC QN AUTO: 29.6 PG
MCHC RBC AUTO-ENTMCNC: 34.7 G/DL
MCV RBC AUTO: 85 FL
MONOCYTES # BLD AUTO: 1.5 K/UL
MONOCYTES NFR BLD: 11.5 %
NEUTROPHILS # BLD AUTO: 10.2 K/UL
NEUTROPHILS NFR BLD: 78.1 %
NRBC BLD-RTO: 0 /100 WBC
PLATELET # BLD AUTO: 209 K/UL
PMV BLD AUTO: 9.9 FL
POTASSIUM SERPL-SCNC: 3.4 MMOL/L
RBC # BLD AUTO: 4.7 M/UL
SODIUM SERPL-SCNC: 140 MMOL/L
WBC # BLD AUTO: 13.08 K/UL

## 2018-09-25 PROCEDURE — 63600175 PHARM REV CODE 636 W HCPCS: Performed by: STUDENT IN AN ORGANIZED HEALTH CARE EDUCATION/TRAINING PROGRAM

## 2018-09-25 PROCEDURE — 36415 COLL VENOUS BLD VENIPUNCTURE: CPT

## 2018-09-25 PROCEDURE — 99231 SBSQ HOSP IP/OBS SF/LOW 25: CPT | Mod: ,,, | Performed by: ANESTHESIOLOGY

## 2018-09-25 PROCEDURE — 94799 UNLISTED PULMONARY SVC/PX: CPT

## 2018-09-25 PROCEDURE — 25000003 PHARM REV CODE 250: Performed by: STUDENT IN AN ORGANIZED HEALTH CARE EDUCATION/TRAINING PROGRAM

## 2018-09-25 PROCEDURE — 80048 BASIC METABOLIC PNL TOTAL CA: CPT

## 2018-09-25 PROCEDURE — 85025 COMPLETE CBC W/AUTO DIFF WBC: CPT

## 2018-09-25 RX ORDER — OXYCODONE AND ACETAMINOPHEN 5; 325 MG/1; MG/1
1 TABLET ORAL EVERY 4 HOURS PRN
Qty: 21 TABLET | Refills: 0 | Status: SHIPPED | OUTPATIENT
Start: 2018-09-25 | End: 2018-10-16

## 2018-09-25 RX ORDER — POLYETHYLENE GLYCOL 3350 17 G/17G
17 POWDER, FOR SOLUTION ORAL DAILY
Qty: 30 PACKET | Refills: 0 | Status: SHIPPED | OUTPATIENT
Start: 2018-09-25 | End: 2022-02-18 | Stop reason: CLARIF

## 2018-09-25 RX ADMIN — HEPARIN SODIUM 5000 UNITS: 5000 INJECTION, SOLUTION INTRAVENOUS; SUBCUTANEOUS at 06:09

## 2018-09-25 RX ADMIN — METHOCARBAMOL 500 MG: 500 TABLET ORAL at 09:09

## 2018-09-25 RX ADMIN — PREGABALIN 75 MG: 75 CAPSULE ORAL at 09:09

## 2018-09-25 RX ADMIN — OXYCODONE HYDROCHLORIDE 10 MG: 10 TABLET ORAL at 06:09

## 2018-09-25 RX ADMIN — POLYETHYLENE GLYCOL 3350 17 G: 17 POWDER, FOR SOLUTION ORAL at 09:09

## 2018-09-25 RX ADMIN — ONDANSETRON HYDROCHLORIDE 4 MG: 2 INJECTION, SOLUTION INTRAMUSCULAR; INTRAVENOUS at 10:09

## 2018-09-25 RX ADMIN — ALLOPURINOL 100 MG: 100 TABLET ORAL at 06:09

## 2018-09-25 RX ADMIN — ACETAMINOPHEN 1000 MG: 10 INJECTION, SOLUTION INTRAVENOUS at 06:09

## 2018-09-25 RX ADMIN — PANTOPRAZOLE SODIUM 40 MG: 40 TABLET, DELAYED RELEASE ORAL at 09:09

## 2018-09-25 RX ADMIN — OXYCODONE HYDROCHLORIDE 10 MG: 10 TABLET ORAL at 11:09

## 2018-09-25 RX ADMIN — ACETAMINOPHEN 1000 MG: 500 TABLET ORAL at 11:09

## 2018-09-25 NOTE — PROGRESS NOTES
[facility]  Urology  Progress Note    Patient Name: Jesu Reynolds  MRN: [id number]  Admission Date: 9/24/2018  Hospital Length of Stay: 1 days  Code Status: Full Code   Attending Provider: Keven Vivar MD   Primary Care Physician: Primary Doctor No    Subjective:     HPI:  Jesu Reynolds is a 53 y.o. male s/p right lap nephrectomy on 9/24/18    Interval History:   No acute events overnight  Pain is well controlled  No nausea  Tolerating regular diet  Ambulating well    Review of Systems  Objective:     Temp:  [96.6 °F (35.9 °C)-98.2 °F (36.8 °C)] 98 °F (36.7 °C)  Pulse:  [65-89] 71  Resp:  [12-21] 20  SpO2:  [94 %-100 %] 96 %  BP: (125-188)/(63-91) 136/81     Body mass index is 36.8 kg/m².            Drains     Drain                 Urethral Catheter 09/24/18 1130 Non-latex 16 Fr. less than 1 day                Physical Exam   Constitutional: He is oriented to person, place, and time. He appears well-developed and well-nourished. No distress.   HENT:   Head: Normocephalic and atraumatic.   Eyes: Conjunctivae are normal. No scleral icterus.   Neck: Normal range of motion. No JVD present.   Cardiovascular: Normal rate and regular rhythm.    Pulmonary/Chest: Effort normal. No respiratory distress.   Abdominal: Soft. He exhibits no distension. There is tenderness (appropriate). There is no rebound and no guarding.   Incisions c/d/i  16 Fr moncada draining clear yellow   Musculoskeletal: Normal range of motion.   SCDs in place   Neurological: He is alert and oriented to person, place, and time.   Skin: Skin is warm and dry. He is not diaphoretic.     Psychiatric: He has a normal mood and affect. His behavior is normal.       Significant Labs:    BMP:  Recent Labs   Lab  09/25/18   0543   NA  140   K  3.4*   CL  106   CO2  26   BUN  16   CREATININE  2.0*   CALCIUM  10.9*       CBC:   Recent Labs   Lab  09/24/18   1306  09/25/18   0543   WBC   --   13.08*   HGB   --   13.9*   HCT  35*  40.1   PLT    --   209       All pertinent labs results from the past 24 hours have been reviewed.    Significant Imaging:  All pertinent imaging results/findings from the past 24 hours have been reviewed.                  Assessment/Plan:     Right renal mass    Jesu Reynolds is a 53 y.o. male s/p right nephrectomy on 9/25/18    - IV tylenol, PO oxycodone for pain control  - regular diet  - SLIV  - Slight rise in creatinine to 2.0  - Ambulate pm of surgery and qid  - Drains: Acosta removed  - Prophylaxis: IS, SCDs, GI ppx  - Epidural catheters to be removed by Anesthesia     Plan for DC home today after voiding trial              VTE Risk Mitigation (From admission, onward)        Ordered     heparin (porcine) injection 5,000 Units  Every 8 hours      09/24/18 1343     Place sequential compression device  Until discontinued      09/24/18 0923     Place HARLAN hose  Until discontinued      09/24/18 0923          Megan Carrion MD  Urology  [facility]

## 2018-09-25 NOTE — ADDENDUM NOTE
Addendum  created 09/25/18 1156 by Luis Gallegos MD    Charge Capture section accepted, Intraprocedure Event edited, Sign clinical note

## 2018-09-25 NOTE — SUBJECTIVE & OBJECTIVE
Interval History:   No acute events overnight  Pain is well controlled  No nausea  Tolerating regular diet  Ambulating well    Review of Systems  Objective:     Temp:  [96.6 °F (35.9 °C)-98.2 °F (36.8 °C)] 98 °F (36.7 °C)  Pulse:  [65-89] 71  Resp:  [12-21] 20  SpO2:  [94 %-100 %] 96 %  BP: (125-188)/(63-91) 136/81     Body mass index is 36.8 kg/m².            Drains     Drain                 Urethral Catheter 09/24/18 1130 Non-latex 16 Fr. less than 1 day                Physical Exam   Constitutional: He is oriented to person, place, and time. He appears well-developed and well-nourished. No distress.   HENT:   Head: Normocephalic and atraumatic.   Eyes: Conjunctivae are normal. No scleral icterus.   Neck: Normal range of motion. No JVD present.   Cardiovascular: Normal rate and regular rhythm.    Pulmonary/Chest: Effort normal. No respiratory distress.   Abdominal: Soft. He exhibits no distension. There is tenderness (appropriate). There is no rebound and no guarding.   Incisions c/d/i  16 Fr moncada draining clear yellow   Musculoskeletal: Normal range of motion.   SCDs in place   Neurological: He is alert and oriented to person, place, and time.   Skin: Skin is warm and dry. He is not diaphoretic.     Psychiatric: He has a normal mood and affect. His behavior is normal.       Significant Labs:    BMP:  Recent Labs   Lab  09/25/18   0543   NA  140   K  3.4*   CL  106   CO2  26   BUN  16   CREATININE  2.0*   CALCIUM  10.9*       CBC:   Recent Labs   Lab  09/24/18   1306  09/25/18   0543   WBC   --   13.08*   HGB   --   13.9*   HCT  35*  40.1   PLT   --   209       All pertinent labs results from the past 24 hours have been reviewed.    Significant Imaging:  All pertinent imaging results/findings from the past 24 hours have been reviewed.

## 2018-09-25 NOTE — DISCHARGE SUMMARY
[facility]  Urology  Discharge Summary      Patient Name: Jesu Reynolds  MRN: [id number]  Admission Date: 9/24/2018  Hospital Length of Stay: 1 days  Discharge Date and Time: 9/25/2018 11:19 AM  Attending Physician: Keven Vivar MD  Discharging Provider: Megan Carrion MD  Primary Care Physician: Primary Doctor No    HPI:   Jesu Reynolds is a 53 y.o. male who presents today for evaluation and management of a right renal mass.     He underwent CT scan 3-4 weeks ago left flank pain which demonstrated large right renal mass.   He denies gross hematuria or recent weight loss. Remote history of smoking but quit 20 years ago. No family history of kidney or bladder cancer. His father did have prostate cancer in his 60s.      He has HTN and arthritis. He has had knee surgery before but no prior abdominal surgeries.      Procedure(s) (LRB):  NEPHRECTOMY, LAPAROSCOPIC/ POSS OPEN (Right)     Indwelling Lines/Drains at time of discharge:   Lines/Drains/Airways          None          Hospital Course (synopsis of major diagnoses, care, treatment, and services provided during the course of the hospital stay): Patient admitted following right lap nephrectomy. He tolerated the procedure well with no complications. On POD 1 his pain was well controlled, he was ambulating and tolerating a regular diet. His moncada was removed and he was discharged home following voiding trial.     Consults:     Significant Diagnostic Studies: see chart review    Pending Diagnostic Studies:     None          Final Active Diagnoses:    Diagnosis Date Noted POA    PRINCIPAL PROBLEM:  Right renal mass [N28.89] 09/19/2018 Yes    Fatty liver [K76.0] 09/20/2018 Yes    Tattoos [L81.8] 09/19/2018 Yes    Snoring [R06.83] 09/19/2018 Yes    Serum total bilirubin elevated [R17] 09/19/2018 Yes    Renal impairment [N28.9] 09/19/2018 Yes    Hypercalcemia [E83.52] 09/19/2018 Yes    Gout [M10.9] 09/19/2018 Yes    Class 2 obesity with  body mass index (BMI) of 35.0 to 35.9 in adult [E66.9, Z68.35] 09/19/2018 Not Applicable    Arthritis [M19.90] 09/19/2018 Yes    Essential hypertension [I10] 09/18/2018 Yes      Problems Resolved During this Admission:       Discharged Condition: good    Disposition: Home or Self Care    Follow Up:  Follow-up Information     Keven Vivar MD In 2 weeks.    Specialty:  Urology  Why:  post op  Contact information:  [address]  [facility] [address]  [phone]                 Patient Instructions:      Notify your health care provider if you experience any of the following:  temperature >100.4     Notify your health care provider if you experience any of the following:  persistent nausea and vomiting or diarrhea     Notify your health care provider if you experience any of the following:  severe uncontrolled pain     Notify your health care provider if you experience any of the following:  redness, tenderness, or signs of infection (pain, swelling, redness, odor or green/yellow discharge around incision site)     Notify your health care provider if you experience any of the following:  difficulty breathing or increased cough     Notify your health care provider if you experience any of the following:  persistent dizziness, light-headedness, or visual disturbances     No dressing needed     Shower on day dressing removed (No bath)     Medications:  Reconciled Home Medications:      Medication List      START taking these medications    oxyCODONE-acetaminophen 5-325 mg per tablet  Commonly known as:  PERCOCET  Take 1 tablet by mouth every 4 (four) hours as needed.     polyethylene glycol 17 gram Pwpk  Commonly known as:  GLYCOLAX  Take 17 g by mouth once daily.        CONTINUE taking these medications    allopurinol 100 MG tablet  Commonly known as:  ZYLOPRIM  Take 100 mg by mouth every morning.     EDARBYCLOR 40-25 mg Tab  Generic drug:  azilsartan med-chlorthalidone  Take by mouth every evening.             Time spent on the discharge of patient: 20 minutes    Megan Carrion MD  Urology  [facility]

## 2018-09-25 NOTE — ASSESSMENT & PLAN NOTE
Jesu Reynolds is a 53 y.o. male s/p right nephrectomy on 9/25/18    - IV tylenol, PO oxycodone for pain control  - regular diet  - SLIV  - Slight rise in creatinine to 2.0  - Ambulate pm of surgery and qid  - Drains: Acosta removed  - Prophylaxis: IS, SCDs, GI ppx  - Epidural catheters to be removed by Anesthesia     Plan for DC home today after voiding trial

## 2018-09-25 NOTE — ADDENDUM NOTE
Addendum  created 09/25/18 1045 by Briseida Pizarro RN    LDA properties accepted, LDA removed, Sign clinical note

## 2018-09-25 NOTE — NURSING
Discharged to home. Condition stable. IV removed. NO redness or swelling noted to site. Verbalizes understanding of discharge instructions

## 2018-09-25 NOTE — DISCHARGE INSTRUCTIONS
What to expect with your Nephrectomy.  [facility]  Updated 06/10/2011  o The night of surgery we expect and hope that you will:  - Walk - walking helps get the bowels moving. Also after your surgery, you are at a risk for a deep venous thrombosis (which is a clot in the legs that can form by remaining inactive or still for extended periods of time) and this can travel to your lungs and make you feel short of breath. This is a very serious condition. Walking helps prevent a DVT from occurring.  - Eat - you do not have to eat a whole meal, but we want to make sure you can tolerate liquid and/or solid food without nausea and vomiting  - Use your incentive spirometer - this is the breathing apparatus that helps you expand your lungs. If and when you have pain you will not want to take deep breaths. But if you dont take deep breaths, you are at risk for pneumonia. The incentive spirometer will help prevent this from occurring by expanding your lungs.  o Symptoms you may experience immediately post-op:  - Bloating and/or shoulder pain if you had a laparoscopic procedure - when we do this operation, we fill up your abdominal cavity with gas to better help us visualize the organs and allow our instruments to fit. After the surgery, not all the air can be removed and your body will eventually absorb this small amount of air. However this can make you feel bloated. In addition, when you sit up, the air can sit right under a muscle (the diaphragm) which has connecting nerves to the shoulders, which could explain why you have shoulder pain.  - Do not expect necessarily to have gas or to have a bowel movement - this goes along with the bloating, you may feel like you want to pass gas or have a bowel movement but you cant. This is normal and you will feel like this for a couple days. There are no pills to help with this. Small walks throughout the day should help with this.  - Pain - your pain should be able to be  controlled with medicines by mouth that we prescribe. It is important for you to tell us if you are on any pain medications at home before the surgery as you may need stronger pain meds while in the hospital.   You can go home when:  o Pain is controlled with medicines by mouth  o You are able to walk without difficulty or pain  o You are tolerating a regulating diet  o Your are voiding.    When you go home:  o Activity  - Continue to walk - small walks throughout the day are better than one long walk.   - Do not lift anything greater than 8 pounds for 6 weeks - we want your abdominal wall muscles to heal.  o Bowel Movements - Do not strain to have a bowel movement - the pain medicines will make you constipated. That is why we also ask you to take colace 2-3 x per day to help keep your bowels regular. If you are still having trouble, then you can also add Miralax once a day. Do not take any stool softeners if you are having diarrhea.  o Smoking - If you smoke, we encourage you STOP. Smoking interferes with the healing process and your prolong your healing with continued smoking.  o Driving - Do not drive while you are on pain meds or with your catheter in place.  o Bathing - If you do not have a drain, you can shower 48 hours after your surgery. If you do have a drain, sponge bathe only until the drain is out.  o Restarting medicines -especially blood thinners (Aspirin, Plavix, Coumadin), Fish Oil. Discuss this with your physician prior to discharge.   When to return to the ER  o Fever - If you have a fever >101.5, this could be due to a number of reasons such as infection of the urine or incision. If your catheter has been removed, you could possibly have a leak. It would be best to come to the ER so they can better evaluate you.  o Severe pain - pain is expected, but severe or new onset of pain is not normal.   o Inability to tolerate food or liquid with nausea and vomiting - it would be best to go to the ER  for them to better evaluate you.

## 2018-09-28 LAB
BLD PROD TYP BPU: NORMAL
BLD PROD TYP BPU: NORMAL
BLOOD UNIT EXPIRATION DATE: NORMAL
BLOOD UNIT EXPIRATION DATE: NORMAL
BLOOD UNIT TYPE CODE: 5100
BLOOD UNIT TYPE CODE: 5100
BLOOD UNIT TYPE: NORMAL
BLOOD UNIT TYPE: NORMAL
CODING SYSTEM: NORMAL
CODING SYSTEM: NORMAL
DISPENSE STATUS: NORMAL
DISPENSE STATUS: NORMAL
TRANS ERYTHROCYTES VOL PATIENT: NORMAL ML
TRANS ERYTHROCYTES VOL PATIENT: NORMAL ML

## 2018-10-11 NOTE — ADDENDUM NOTE
Addendum  created 10/11/18 1253 by Antony Lovell Jr., MD    Intraprocedure Blocks edited, Sign clinical note

## 2018-10-16 ENCOUNTER — OFFICE VISIT (OUTPATIENT)
Dept: UROLOGY | Facility: CLINIC | Age: 53
End: 2018-10-16
Payer: COMMERCIAL

## 2018-10-16 VITALS
DIASTOLIC BLOOD PRESSURE: 81 MMHG | RESPIRATION RATE: 15 BRPM | BODY MASS INDEX: 34.23 KG/M2 | HEIGHT: 74 IN | WEIGHT: 266.75 LBS | SYSTOLIC BLOOD PRESSURE: 132 MMHG | HEART RATE: 68 BPM

## 2018-10-16 DIAGNOSIS — N28.89 RIGHT RENAL MASS: Primary | ICD-10-CM

## 2018-10-16 PROCEDURE — 99999 PR PBB SHADOW E&M-EST. PATIENT-LVL III: CPT | Mod: PBBFAC,,, | Performed by: UROLOGY

## 2018-10-16 PROCEDURE — 99024 POSTOP FOLLOW-UP VISIT: CPT | Mod: S$PBB,,, | Performed by: UROLOGY

## 2018-10-16 PROCEDURE — 99213 OFFICE O/P EST LOW 20 MIN: CPT | Mod: PBBFAC | Performed by: UROLOGY

## 2018-10-16 NOTE — PROGRESS NOTES
Clinic Note  10/16/2018      Subjective:         Chief Complaint:   SABA Reynolds is a 53 y.o. male s/p right lap nephrectomy on 2018.    Path:  Procedure: Total nephrectomy.  Specimen laterality: Right.  Tumor size: 9.3 cm in greatest dimension.  Tumor focality: Unifocal  Histologic type: Papillary renal cell carcinoma, type 1  Sarcomatoid features: Not identified.  Rhabdoid features: Not identified.  Histologic grade: Grade 3  Tumor necrosis: Present, approximately 20% of tumor.  Tumor extension: Tumor extends into renal vein.  Margins: Involved by invasive carcinoma, renal vein margin.  Regional lymph nodes: None submitted or identified.  Pathologic findings in nonneoplastic kidney: None identified.  Pathologic stage;  Tumor stage: pT3a: tumor extends into renal vein.  Lymph node stage: pNx: Regional lymph nodes cannot be assessed.      Past Medical History:   Diagnosis Date    Arthritis     Disorder of kidney and ureter     Gout     Hypertension     Prostatitis age 30's     Family History   Problem Relation Age of Onset    Cancer Father     Prostate cancer Father     Cancer Mother     Heart disease Mother      Social History     Socioeconomic History    Marital status: Legally      Spouse name: Not on file    Number of children: Not on file    Years of education: Not on file    Highest education level: Not on file   Social Needs    Financial resource strain: Not on file    Food insecurity - worry: Not on file    Food insecurity - inability: Not on file    Transportation needs - medical: Not on file    Transportation needs - non-medical: Not on file   Occupational History    Not on file   Tobacco Use    Smoking status: Former Smoker     Packs/day: 0.50     Years: 6.00     Pack years: 3.00     Last attempt to quit:      Years since quittin.8    Smokeless tobacco: Never Used   Substance and Sexual Activity    Alcohol use: No     Frequency: Never    Drug use: No  "   Sexual activity: Yes     Partners: Female   Other Topics Concern    Not on file   Social History Narrative    Not on file     Past Surgical History:   Procedure Laterality Date    KNEE CARTILAGE SURGERY Right     5     LAPAROSCOPIC NEPHRECTOMY Right 9/24/2018    Procedure: NEPHRECTOMY, LAPAROSCOPIC/ POSS OPEN;  Surgeon: Keven Vivar MD;  Location: [facility] OR [address] [place] [address];  Service: Urology;  Laterality: Right;  3 HRS TAP BLOCK    NEPHRECTOMY, LAPAROSCOPIC/ POSS OPEN Right 9/24/2018    Performed by Keven Vivar MD at [facility] OR [address] [place] [address]     Patient Active Problem List   Diagnosis    Essential hypertension    Gout    Arthritis    Class 2 obesity with body mass index (BMI) of 35.0 to 35.9 in adult    Tattoos    Snoring    Renal impairment    Hypercalcemia    Serum total bilirubin elevated    Right renal mass    Fatty liver     Review of Systems      Objective:      There were no vitals taken for this visit.  Estimated body mass index is 36.8 kg/m² as calculated from the following:    Height as of 9/24/18: 6' 2" (1.88 m).    Weight as of 9/24/18: 130 kg (286 lb 9.6 oz).  Physical Exam   Abdominal:   Incisions healing well, no infection.         Assessment and Plan:           Problem List Items Addressed This Visit     Right renal mass - Primary          Follow up:     6 months with CXR, CT scan, CMP.  Keven Vivar      "

## 2019-04-18 ENCOUNTER — HOSPITAL ENCOUNTER (OUTPATIENT)
Dept: RADIOLOGY | Facility: HOSPITAL | Age: 54
Discharge: HOME | End: 2019-04-18
Attending: UROLOGY
Payer: MEDICARE

## 2019-04-18 ENCOUNTER — OFFICE VISIT (OUTPATIENT)
Dept: UROLOGY | Facility: CLINIC | Age: 54
End: 2019-04-18
Payer: MEDICARE

## 2019-04-18 VITALS — HEART RATE: 74 BPM | SYSTOLIC BLOOD PRESSURE: 119 MMHG | DIASTOLIC BLOOD PRESSURE: 79 MMHG

## 2019-04-18 DIAGNOSIS — N28.89 RIGHT RENAL MASS: ICD-10-CM

## 2019-04-18 DIAGNOSIS — C64.1 CANCER OF KIDNEY PARENCHYMA, RIGHT: Primary | ICD-10-CM

## 2019-04-18 LAB
CREAT SERPL-MCNC: 1.9 MG/DL (ref 0.5–1.4)
SAMPLE: ABNORMAL

## 2019-04-18 PROCEDURE — 71046 X-RAY EXAM CHEST 2 VIEWS: CPT | Mod: 26,,, | Performed by: RADIOLOGY

## 2019-04-18 PROCEDURE — 99214 PR OFFICE/OUTPT VISIT, EST, LEVL IV, 30-39 MIN: ICD-10-PCS | Mod: S$GLB,,, | Performed by: UROLOGY

## 2019-04-18 PROCEDURE — 74170 CT ABD WO CNTRST FLWD CNTRST: CPT | Mod: 26,,, | Performed by: RADIOLOGY

## 2019-04-18 PROCEDURE — 3078F DIAST BP <80 MM HG: CPT | Mod: CPTII,S$GLB,, | Performed by: UROLOGY

## 2019-04-18 PROCEDURE — 99999 PR PBB SHADOW E&M-EST. PATIENT-LVL III: CPT | Mod: PBBFAC,,, | Performed by: UROLOGY

## 2019-04-18 PROCEDURE — 99999 PR PBB SHADOW E&M-EST. PATIENT-LVL III: ICD-10-PCS | Mod: PBBFAC,,, | Performed by: UROLOGY

## 2019-04-18 PROCEDURE — 3074F SYST BP LT 130 MM HG: CPT | Mod: CPTII,S$GLB,, | Performed by: UROLOGY

## 2019-04-18 PROCEDURE — 71046 XR CHEST PA AND LATERAL: ICD-10-PCS | Mod: 26,,, | Performed by: RADIOLOGY

## 2019-04-18 PROCEDURE — 25500020 PHARM REV CODE 255: Performed by: UROLOGY

## 2019-04-18 PROCEDURE — 74170 CT ABD WO CNTRST FLWD CNTRST: CPT | Mod: TC

## 2019-04-18 PROCEDURE — 3078F PR MOST RECENT DIASTOLIC BLOOD PRESSURE < 80 MM HG: ICD-10-PCS | Mod: CPTII,S$GLB,, | Performed by: UROLOGY

## 2019-04-18 PROCEDURE — 3074F PR MOST RECENT SYSTOLIC BLOOD PRESSURE < 130 MM HG: ICD-10-PCS | Mod: CPTII,S$GLB,, | Performed by: UROLOGY

## 2019-04-18 PROCEDURE — 99214 OFFICE O/P EST MOD 30 MIN: CPT | Mod: S$GLB,,, | Performed by: UROLOGY

## 2019-04-18 PROCEDURE — 71046 X-RAY EXAM CHEST 2 VIEWS: CPT | Mod: TC

## 2019-04-18 PROCEDURE — 74170 CT ABDOMEN W WO CONTRAST: ICD-10-PCS | Mod: 26,,, | Performed by: RADIOLOGY

## 2019-04-18 RX ADMIN — IOHEXOL 100 ML: 350 INJECTION, SOLUTION INTRAVENOUS at 12:04

## 2019-04-18 NOTE — LETTER
April 18, 2019        Jay Martinez MD  [address]  [place] [address]             [facility] - Urology [place] [id number]  [place] [address]  Phone: [phone]   Patient: Jesu Reynolds   MR Number: [id number]   YOB: 1965   Date of Visit: 4/18/2019       Dear Dr. Martinez:    Thank you for referring Jesu Reynolds to me for evaluation. Attached you will find relevant portions of my assessment and plan of care.    If you have questions, please do not hesitate to call me. I look forward to following Jesu Reynolds along with you.    Sincerely,      Keven Vivar MD            CC  No Recipients    Enclosure

## 2019-04-18 NOTE — PROGRESS NOTES
Clinic Note  2019      Subjective:         Chief Complaint:   SABA Reynolds is a 53 y.o. male s/p right lap nephrectomy on 2018.     Path:  Procedure: Total nephrectomy.  Specimen laterality: Right.  Tumor size: 9.3 cm in greatest dimension.  Tumor focality: Unifocal  Histologic type: Papillary renal cell carcinoma, type 1  Sarcomatoid features: Not identified.  Rhabdoid features: Not identified.  Histologic grade: Grade 3  Tumor necrosis: Present, approximately 20% of tumor.  Tumor extension: Tumor extends into renal vein.  Margins: Involved by invasive carcinoma, renal vein margin.  Regional lymph nodes: None submitted or identified.  Pathologic findings in nonneoplastic kidney: None identified.  Pathologic stage;  Tumor stage: pT3a: tumor extends into renal vein.  Lymph node stage: pNx: Regional lymph nodes cannot be assessed.      CMP- creatinine 1.9, CXR and CT scan- no mets to my review.    Past Medical History:   Diagnosis Date    Arthritis     Disorder of kidney and ureter     Gout     Hypertension     Prostatitis age 31's     Family History   Problem Relation Age of Onset    Cancer Father     Prostate cancer Father     Cancer Mother     Heart disease Mother      Social History     Socioeconomic History    Marital status: Legally      Spouse name: Not on file    Number of children: Not on file    Years of education: Not on file    Highest education level: Not on file   Occupational History    Not on file   Social Needs    Financial resource strain: Not on file    Food insecurity:     Worry: Not on file     Inability: Not on file    Transportation needs:     Medical: Not on file     Non-medical: Not on file   Tobacco Use    Smoking status: Former Smoker     Packs/day: 0.50     Years: 6.00     Pack years: 3.00     Last attempt to quit:      Years since quittin.3    Smokeless tobacco: Never Used   Substance and Sexual Activity    Alcohol use: No      Frequency: Never    Drug use: No    Sexual activity: Yes     Partners: Female   Lifestyle    Physical activity:     Days per week: Not on file     Minutes per session: Not on file    Stress: Not on file   Relationships    Social connections:     Talks on phone: Not on file     Gets together: Not on file     Attends Jainism service: Not on file     Active member of club or organization: Not on file     Attends meetings of clubs or organizations: Not on file     Relationship status: Not on file   Other Topics Concern    Not on file   Social History Narrative    Not on file     Past Surgical History:   Procedure Laterality Date    KNEE CARTILAGE SURGERY Right     5     NEPHRECTOMY, LAPAROSCOPIC/ POSS OPEN Right 9/24/2018    Performed by Keven Vivar MD at [facility] OR [address] [place] [address]     Patient Active Problem List   Diagnosis    Essential hypertension    Gout    Arthritis    Class 2 obesity with body mass index (BMI) of 35.0 to 35.9 in adult    Tattoos    Snoring    Renal impairment    Hypercalcemia    Serum total bilirubin elevated    Right renal mass    Fatty liver     Review of Systems   Constitutional: Negative for appetite change, chills, fatigue, fever and unexpected weight change.   HENT: Negative for nosebleeds.    Respiratory: Negative for shortness of breath and wheezing.    Cardiovascular: Negative for chest pain, palpitations and leg swelling.   Gastrointestinal: Negative for abdominal distention, abdominal pain, constipation, diarrhea, nausea and vomiting.   Genitourinary: Negative for dysuria, frequency, hematuria and urgency.   Musculoskeletal: Negative for arthralgias and back pain.   Skin: Negative for pallor.   Neurological: Negative for dizziness, seizures and syncope.   Hematological: Negative for adenopathy.   Psychiatric/Behavioral: Negative for dysphoric mood.         Objective:      /79   Pulse 74   Estimated body mass index is 34.25 kg/m² as calculated from the  "following:    Height as of 10/16/18: 6' 2" (1.88 m).    Weight as of 10/16/18: 121 kg (266 lb 12.1 oz).  Physical Exam   Constitutional: He is oriented to person, place, and time. He appears well-developed and well-nourished. No distress.   HENT:   Head: Atraumatic.   Neck: No tracheal deviation present.   Cardiovascular: Normal rate.    Pulmonary/Chest: Effort normal. No respiratory distress. He has no wheezes.   Abdominal: Soft. Bowel sounds are normal. He exhibits no distension and no mass. There is no tenderness. There is no rebound and no guarding.   Neurological: He is alert and oriented to person, place, and time.   Skin: Skin is warm and dry. He is not diaphoretic.     Psychiatric: He has a normal mood and affect. His behavior is normal. Judgment and thought content normal.         Assessment and Plan:           Problem List Items Addressed This Visit     None          Follow up:   Letter to Dr. Martinez. Discussed is CKD and hypertension management.  6 months with BMP, CXR, renal sonogram.    Keven Vivar      "

## 2021-11-10 ENCOUNTER — TELEPHONE (OUTPATIENT)
Dept: UROLOGY | Facility: CLINIC | Age: 56
End: 2021-11-10
Payer: MEDICARE

## 2021-11-11 ENCOUNTER — TELEPHONE (OUTPATIENT)
Dept: UROLOGY | Facility: CLINIC | Age: 56
End: 2021-11-11
Payer: MEDICARE

## 2021-11-26 DIAGNOSIS — N28.89 RIGHT RENAL MASS: Primary | ICD-10-CM

## 2021-12-14 ENCOUNTER — OFFICE VISIT (OUTPATIENT)
Dept: UROLOGY | Facility: CLINIC | Age: 56
End: 2021-12-14
Payer: MEDICARE

## 2021-12-14 VITALS
WEIGHT: 251.31 LBS | HEART RATE: 76 BPM | HEIGHT: 74 IN | DIASTOLIC BLOOD PRESSURE: 79 MMHG | SYSTOLIC BLOOD PRESSURE: 121 MMHG | BODY MASS INDEX: 32.25 KG/M2

## 2021-12-14 DIAGNOSIS — C64.1 CANCER OF KIDNEY PARENCHYMA, RIGHT: Primary | ICD-10-CM

## 2021-12-14 DIAGNOSIS — C61 PROSTATE CANCER: ICD-10-CM

## 2021-12-14 PROCEDURE — 99999 PR PBB SHADOW E&M-EST. PATIENT-LVL III: CPT | Mod: PBBFAC,,, | Performed by: UROLOGY

## 2021-12-14 PROCEDURE — 99214 PR OFFICE/OUTPT VISIT, EST, LEVL IV, 30-39 MIN: ICD-10-PCS | Mod: S$GLB,,, | Performed by: UROLOGY

## 2021-12-14 PROCEDURE — 99214 OFFICE O/P EST MOD 30 MIN: CPT | Mod: S$GLB,,, | Performed by: UROLOGY

## 2021-12-14 PROCEDURE — 99999 PR PBB SHADOW E&M-EST. PATIENT-LVL III: ICD-10-PCS | Mod: PBBFAC,,, | Performed by: UROLOGY

## 2022-01-24 NOTE — H&P (VIEW-ONLY)
Clinic Note  2022      Subjective:         Chief Complaint:   SABA Reynolds is a 56 y.o. male s/p right lap nephrectomy on 2018.  Recently diagnosed with prostate cancer. Just retired from [employer]. Best ribs!  Has 27 y/o wife, has  son at home.     FH:positive father had prostate and colon cancer.  Biopsy 2021- [place] 3+4 (GG2) right middle 9%, right base 20%. 2/12 cores positive  PSA 7.64  Volume- 28 ccs  Stage T1C  NURYS- 3 intermediate  NCCN- favorable intermediate   MRI 2022- RMTZ 1.6 cm PI-RADS  lesion. Close to capsule at 9 o'clock. Negative NVBI (neurovascular bundle involvement), SVI (seminal vesicle involvement), nodes.This is my review. Images have not been reported by radiology yet.  Germline:indicated, ordered.  Somatic:        Renal:  Path:  Procedure: Total nephrectomy.  Specimen laterality: Right.  Tumor size: 9.3 cm in greatest dimension.  Tumor focality: Unifocal  Histologic type: Papillary renal cell carcinoma, type 1  Sarcomatoid features: Not identified.  Rhabdoid features: Not identified.  Histologic grade: Grade 3  Tumor necrosis: Present, approximately 20% of tumor.  Tumor extension: Tumor extends into renal vein.  Margins: Involved by invasive carcinoma, renal vein margin.  Regional lymph nodes: None submitted or identified.  Pathologic findings in nonneoplastic kidney: None identified.  Pathologic stage;  Tumor stage: pT3a: tumor extends into renal vein.  Lymph node stage: pNx: Regional lymph nodes cannot be assessed.      No results found for: PSA, PSADIAG, PSATOTAL, PSAFREE, PSAFREEPCT   Past Medical History:   Diagnosis Date    Arthritis     Disorder of kidney and ureter     Elevated PSA     Gout     Hypertension     Prostatitis age 30's     Family History   Problem Relation Age of Onset    Cancer Father     Prostate cancer Father     Cancer Mother     Heart disease Mother      Social History     Socioeconomic History    Marital  "status: Legally    Tobacco Use    Smoking status: Former Smoker     Packs/day: 0.50     Years: 6.00     Pack years: 3.00     Quit date:      Years since quittin.0    Smokeless tobacco: Never Used   Substance and Sexual Activity    Alcohol use: No    Drug use: No    Sexual activity: Yes     Partners: Female     Past Surgical History:   Procedure Laterality Date    KNEE CARTILAGE SURGERY Right     5     LAPAROSCOPIC NEPHRECTOMY Right 2018    Procedure: NEPHRECTOMY, LAPAROSCOPIC/ POSS OPEN;  Surgeon: Keven Vivar MD;  Location: [facility] OR [address] [place] [address];  Service: Urology;  Laterality: Right;  3 HRS TAP BLOCK    NEPHRECTOMY       Patient Active Problem List   Diagnosis    Essential hypertension    Gout    Arthritis    Class 2 obesity with body mass index (BMI) of 35.0 to 35.9 in adult    Tattoos    Snoring    Renal impairment    Hypercalcemia    Serum total bilirubin elevated    Right renal mass    Fatty liver    Cancer of kidney parenchyma, right     Review of Systems      Objective:      There were no vitals taken for this visit.  Estimated body mass index is 32.27 kg/m² as calculated from the following:    Height as of 21: 6' 2" (1.88 m).    Weight as of 21: 114 kg (251 lb 5.2 oz).  Physical Exam      Assessment and Plan:           Problem List Items Addressed This Visit    None         Follow up:   Barry goel consult for germline.  Today's visit was spent almost entirely on counseling. We reviewed his diagnosis, stage, grade, risk group, and prognosis. We discussed [place] (NCCN) and NURYS risk stratification  We discussed the concept of low risk, intermediate risk, and high risk disease. We also reviewed the NCCN treatment nomogram.We discussed the different treatment options including active surveillance (as well as the surveillance protocol), prostate brachytherapy, EBRT, SBRT (stereotactic body radiation therapy),cryotherapy, HIFU and both open and robotic " prostatectomy.We also discussed the advantages, disadvantages, risks and benefits, as well as complications of each option. Regarding radiation therapy we discussed treatment planning, the different techniques, short and long term complications. These included radiation cystitis, radiation proctitis, and impotence. We discussed success, failure, and salvage therapeutic options.Also discussed the use of SpaceOAR for brachytherapy and EBRT and fiducials for EBRT.   We discussed surgical therapy in depth including preoperative preparation, surgical technique (including bladder neck and nerve-sparing techniques), postoperative recuperation and recovery, and short and long term complications including UTI, bleeding, blood clots,catheter dislodgement, etc. We discussed the risks of reoperation, incontinence, impotence, and recurrence. We discussed preop and postop Kegels, post op penile rehab, and treatment options for incontinence and impotence. We discussed rates of cancer free survival and recurrence, as well as salvage therapeutic options. We discussed the possible  indications for adjuvant radiation therapy.   I answered questions and addressed concerns. Also discussed the Prolaris test to get genetic information about this tumors potential  .  Patient leaning towards brachytherapy.  appointment with Dr Dwyer/ Zachariah.    Keven Vivar

## 2022-01-24 NOTE — PROGRESS NOTES
Clinic Note  2022      Subjective:         Chief Complaint:   SABA Reynolds is a 56 y.o. male s/p right lap nephrectomy on 2018.  Recently diagnosed with prostate cancer. Just retired from [employer]. Best ribs!  Has 29 y/o wife, has  son at home.     FH:positive father had prostate and colon cancer.  Biopsy 2021- [place] 3+4 (GG2) right middle 9%, right base 20%. 2/12 cores positive  PSA 7.64  Volume- 28 ccs  Stage T1C  NURYS- 3 intermediate  NCCN- favorable intermediate   MRI 2022- RMTZ 1.6 cm PI-RADS  lesion. Close to capsule at 9 o'clock. Negative NVBI (neurovascular bundle involvement), SVI (seminal vesicle involvement), nodes.This is my review. Images have not been reported by radiology yet.  Germline:indicated, ordered.  Somatic:        Renal:  Path:  Procedure: Total nephrectomy.  Specimen laterality: Right.  Tumor size: 9.3 cm in greatest dimension.  Tumor focality: Unifocal  Histologic type: Papillary renal cell carcinoma, type 1  Sarcomatoid features: Not identified.  Rhabdoid features: Not identified.  Histologic grade: Grade 3  Tumor necrosis: Present, approximately 20% of tumor.  Tumor extension: Tumor extends into renal vein.  Margins: Involved by invasive carcinoma, renal vein margin.  Regional lymph nodes: None submitted or identified.  Pathologic findings in nonneoplastic kidney: None identified.  Pathologic stage;  Tumor stage: pT3a: tumor extends into renal vein.  Lymph node stage: pNx: Regional lymph nodes cannot be assessed.      No results found for: PSA, PSADIAG, PSATOTAL, PSAFREE, PSAFREEPCT   Past Medical History:   Diagnosis Date    Arthritis     Disorder of kidney and ureter     Elevated PSA     Gout     Hypertension     Prostatitis age 30's     Family History   Problem Relation Age of Onset    Cancer Father     Prostate cancer Father     Cancer Mother     Heart disease Mother      Social History     Socioeconomic History    Marital  "status: Legally    Tobacco Use    Smoking status: Former Smoker     Packs/day: 0.50     Years: 6.00     Pack years: 3.00     Quit date:      Years since quittin.0    Smokeless tobacco: Never Used   Substance and Sexual Activity    Alcohol use: No    Drug use: No    Sexual activity: Yes     Partners: Female     Past Surgical History:   Procedure Laterality Date    KNEE CARTILAGE SURGERY Right     5     LAPAROSCOPIC NEPHRECTOMY Right 2018    Procedure: NEPHRECTOMY, LAPAROSCOPIC/ POSS OPEN;  Surgeon: Keven Vivar MD;  [facility] OR [address] [place] [address];  Service: Urology;  Laterality: Right;  3 HRS TAP BLOCK    NEPHRECTOMY       Patient Active Problem List   Diagnosis    Essential hypertension    Gout    Arthritis    Class 2 obesity with body mass index (BMI) of 35.0 to 35.9 in adult    Tattoos    Snoring    Renal impairment    Hypercalcemia    Serum total bilirubin elevated    Right renal mass    Fatty liver    Cancer of kidney parenchyma, right     Review of Systems      Objective:      There were no vitals taken for this visit.  Estimated body mass index is 32.27 kg/m² as calculated from the following:    Height as of 21: 6' 2" (1.88 m).    Weight as of 21: 114 kg (251 lb 5.2 oz).  Physical Exam      Assessment and Plan:           Problem List Items Addressed This Visit    None         Follow up:   Barry goel consult for germline.  Today's visit was spent almost entirely on counseling. We reviewed his diagnosis, stage, grade, risk group, and prognosis. We discussed [place] (NCCN) and NURYS risk stratification  We discussed the concept of low risk, intermediate risk, and high risk disease. We also reviewed the NCCN treatment nomogram.We discussed the different treatment options including active surveillance (as well as the surveillance protocol), prostate brachytherapy, EBRT, SBRT (stereotactic body radiation therapy),cryotherapy, HIFU and both open and robotic " prostatectomy.We also discussed the advantages, disadvantages, risks and benefits, as well as complications of each option. Regarding radiation therapy we discussed treatment planning, the different techniques, short and long term complications. These included radiation cystitis, radiation proctitis, and impotence. We discussed success, failure, and salvage therapeutic options.Also discussed the use of SpaceOAR for brachytherapy and EBRT and fiducials for EBRT.   We discussed surgical therapy in depth including preoperative preparation, surgical technique (including bladder neck and nerve-sparing techniques), postoperative recuperation and recovery, and short and long term complications including UTI, bleeding, blood clots,catheter dislodgement, etc. We discussed the risks of reoperation, incontinence, impotence, and recurrence. We discussed preop and postop Kegels, post op penile rehab, and treatment options for incontinence and impotence. We discussed rates of cancer free survival and recurrence, as well as salvage therapeutic options. We discussed the possible  indications for adjuvant radiation therapy.   I answered questions and addressed concerns. Also discussed the Prolaris test to get genetic information about this tumors potential  .  Patient leaning towards brachytherapy.  appointment with Dr Dwyer/ Zachariah.    Keven Vivar

## 2022-01-25 ENCOUNTER — HOSPITAL ENCOUNTER (OUTPATIENT)
Dept: RADIOLOGY | Facility: HOSPITAL | Age: 57
Discharge: HOME | End: 2022-01-25
Attending: UROLOGY
Payer: MEDICARE

## 2022-01-25 ENCOUNTER — OFFICE VISIT (OUTPATIENT)
Dept: UROLOGY | Facility: CLINIC | Age: 57
End: 2022-01-25
Payer: MEDICARE

## 2022-01-25 VITALS
HEIGHT: 74 IN | DIASTOLIC BLOOD PRESSURE: 78 MMHG | WEIGHT: 253.5 LBS | BODY MASS INDEX: 32.53 KG/M2 | HEART RATE: 60 BPM | SYSTOLIC BLOOD PRESSURE: 137 MMHG

## 2022-01-25 DIAGNOSIS — C61 PROSTATE CANCER: Primary | ICD-10-CM

## 2022-01-25 DIAGNOSIS — C61 PROSTATE CANCER: ICD-10-CM

## 2022-01-25 DIAGNOSIS — C64.1 CANCER OF KIDNEY PARENCHYMA, RIGHT: ICD-10-CM

## 2022-01-25 LAB
CREAT SERPL-MCNC: 1.7 MG/DL (ref 0.5–1.4)
SAMPLE: ABNORMAL

## 2022-01-25 PROCEDURE — A9585 GADOBUTROL INJECTION: HCPCS | Performed by: UROLOGY

## 2022-01-25 PROCEDURE — 72197 MRI PELVIS W/O & W/DYE: CPT | Mod: TC

## 2022-01-25 PROCEDURE — 99999 PR PBB SHADOW E&M-EST. PATIENT-LVL III: CPT | Mod: PBBFAC,,, | Performed by: UROLOGY

## 2022-01-25 PROCEDURE — 3078F DIAST BP <80 MM HG: CPT | Mod: CPTII,S$GLB,, | Performed by: UROLOGY

## 2022-01-25 PROCEDURE — 3075F SYST BP GE 130 - 139MM HG: CPT | Mod: CPTII,S$GLB,, | Performed by: UROLOGY

## 2022-01-25 PROCEDURE — 99215 OFFICE O/P EST HI 40 MIN: CPT | Mod: S$GLB,,, | Performed by: UROLOGY

## 2022-01-25 PROCEDURE — 3075F PR MOST RECENT SYSTOLIC BLOOD PRESS GE 130-139MM HG: ICD-10-PCS | Mod: CPTII,S$GLB,, | Performed by: UROLOGY

## 2022-01-25 PROCEDURE — 3078F PR MOST RECENT DIASTOLIC BLOOD PRESSURE < 80 MM HG: ICD-10-PCS | Mod: CPTII,S$GLB,, | Performed by: UROLOGY

## 2022-01-25 PROCEDURE — 3008F PR BODY MASS INDEX (BMI) DOCUMENTED: ICD-10-PCS | Mod: CPTII,S$GLB,, | Performed by: UROLOGY

## 2022-01-25 PROCEDURE — 25500020 PHARM REV CODE 255: Performed by: UROLOGY

## 2022-01-25 PROCEDURE — 99999 PR PBB SHADOW E&M-EST. PATIENT-LVL III: ICD-10-PCS | Mod: PBBFAC,,, | Performed by: UROLOGY

## 2022-01-25 PROCEDURE — 72197 MRI PROSTATE W W/O CONTRAST: ICD-10-PCS | Mod: 26,,, | Performed by: RADIOLOGY

## 2022-01-25 PROCEDURE — 1159F PR MEDICATION LIST DOCUMENTED IN MEDICAL RECORD: ICD-10-PCS | Mod: CPTII,S$GLB,, | Performed by: UROLOGY

## 2022-01-25 PROCEDURE — 99215 PR OFFICE/OUTPT VISIT, EST, LEVL V, 40-54 MIN: ICD-10-PCS | Mod: S$GLB,,, | Performed by: UROLOGY

## 2022-01-25 PROCEDURE — 72197 MRI PELVIS W/O & W/DYE: CPT | Mod: 26,,, | Performed by: RADIOLOGY

## 2022-01-25 PROCEDURE — 1159F MED LIST DOCD IN RCRD: CPT | Mod: CPTII,S$GLB,, | Performed by: UROLOGY

## 2022-01-25 PROCEDURE — 3008F BODY MASS INDEX DOCD: CPT | Mod: CPTII,S$GLB,, | Performed by: UROLOGY

## 2022-01-25 RX ORDER — GADOBUTROL 604.72 MG/ML
10 INJECTION INTRAVENOUS
Status: COMPLETED | OUTPATIENT
Start: 2022-01-25 | End: 2022-01-25

## 2022-01-25 RX ADMIN — GADOBUTROL 10 ML: 604.72 INJECTION INTRAVENOUS at 07:01

## 2022-01-25 NOTE — LETTER
January 25, 2022        Barry Ellison, DNP  [address]  [facility] [address]             [facility] - Urology 2nd Fl  [address]  [facility] [address]  Phone: [phone]   Patient: Jesu Reynolds   MR Number: [id number]   YOB: 1965   Date of Visit: 1/25/2022       Dear Dr. Ellison:    Thank you for referring Jesu Reynolds to me for evaluation. Attached you will find relevant portions of my assessment and plan of care.    If you have questions, please do not hesitate to call me. I look forward to following Jesu Reynolds along with you.    Sincerely,      Keven Vivar MD            CC    No Recipients    Enclosure

## 2022-02-01 ENCOUNTER — HOSPITAL ENCOUNTER (OUTPATIENT)
Dept: RADIATION THERAPY | Facility: HOSPITAL | Age: 57
Discharge: HOME | End: 2022-02-01
Attending: RADIOLOGY
Payer: MEDICARE

## 2022-02-01 ENCOUNTER — OFFICE VISIT (OUTPATIENT)
Dept: RADIATION ONCOLOGY | Facility: CLINIC | Age: 57
End: 2022-02-01
Payer: MEDICARE

## 2022-02-01 VITALS
RESPIRATION RATE: 16 BRPM | HEIGHT: 74 IN | WEIGHT: 260.38 LBS | BODY MASS INDEX: 33.42 KG/M2 | SYSTOLIC BLOOD PRESSURE: 160 MMHG | HEART RATE: 72 BPM | DIASTOLIC BLOOD PRESSURE: 98 MMHG

## 2022-02-01 DIAGNOSIS — C61 PROSTATE CANCER: ICD-10-CM

## 2022-02-01 PROCEDURE — 3077F PR MOST RECENT SYSTOLIC BLOOD PRESSURE >= 140 MM HG: ICD-10-PCS | Mod: CPTII,S$GLB,, | Performed by: RADIOLOGY

## 2022-02-01 PROCEDURE — 99204 PR OFFICE/OUTPT VISIT, NEW, LEVL IV, 45-59 MIN: ICD-10-PCS | Mod: S$GLB,,, | Performed by: RADIOLOGY

## 2022-02-01 PROCEDURE — 1160F PR REVIEW ALL MEDS BY PRESCRIBER/CLIN PHARMACIST DOCUMENTED: ICD-10-PCS | Mod: CPTII,S$GLB,, | Performed by: RADIOLOGY

## 2022-02-01 PROCEDURE — 3008F BODY MASS INDEX DOCD: CPT | Mod: CPTII,S$GLB,, | Performed by: RADIOLOGY

## 2022-02-01 PROCEDURE — 3080F DIAST BP >= 90 MM HG: CPT | Mod: CPTII,S$GLB,, | Performed by: RADIOLOGY

## 2022-02-01 PROCEDURE — 1160F RVW MEDS BY RX/DR IN RCRD: CPT | Mod: CPTII,S$GLB,, | Performed by: RADIOLOGY

## 2022-02-01 PROCEDURE — 3080F PR MOST RECENT DIASTOLIC BLOOD PRESSURE >= 90 MM HG: ICD-10-PCS | Mod: CPTII,S$GLB,, | Performed by: RADIOLOGY

## 2022-02-01 PROCEDURE — 3077F SYST BP >= 140 MM HG: CPT | Mod: CPTII,S$GLB,, | Performed by: RADIOLOGY

## 2022-02-01 PROCEDURE — 1159F PR MEDICATION LIST DOCUMENTED IN MEDICAL RECORD: ICD-10-PCS | Mod: CPTII,S$GLB,, | Performed by: RADIOLOGY

## 2022-02-01 PROCEDURE — 1159F MED LIST DOCD IN RCRD: CPT | Mod: CPTII,S$GLB,, | Performed by: RADIOLOGY

## 2022-02-01 PROCEDURE — 99999 PR PBB SHADOW E&M-EST. PATIENT-LVL III: CPT | Mod: PBBFAC,,, | Performed by: RADIOLOGY

## 2022-02-01 PROCEDURE — 99204 OFFICE O/P NEW MOD 45 MIN: CPT | Mod: S$GLB,,, | Performed by: RADIOLOGY

## 2022-02-01 PROCEDURE — 3008F PR BODY MASS INDEX (BMI) DOCUMENTED: ICD-10-PCS | Mod: CPTII,S$GLB,, | Performed by: RADIOLOGY

## 2022-02-01 PROCEDURE — 99999 PR PBB SHADOW E&M-EST. PATIENT-LVL III: ICD-10-PCS | Mod: PBBFAC,,, | Performed by: RADIOLOGY

## 2022-02-01 NOTE — PROGRESS NOTES
Multidisciplinary Uro-Oncology Clinic    HISTORY OF PRESENT ILLNESS:   This patient presents for discussion of treatment options for a recently diagnosed prostate cancer.     Mr. Reynolds's urologic history is significant for a Stage III (T3a, N0, M0) Papillary renal cell carcinoma, type 1, of the Rt. kidney.  He is status post Rt. total nephrectomy in September of 2018.  He has remained MEGAN since that time.  The patient currently lives in [facility].  Screening PSA in September of 2021 returned elevated at 7.6 ng/ml.  Subsequent biopsies of the prostate on 11/2/21 revealed Clarence 7 (3+4) adenocarcinoma involving 20% of the core from the Rt. medial base and 9% of the core from the Rt. medial mid gland.  There was a number of areas of HGPIN with suspicious cells in the Lt. medial mid gland, Lt. medial apex, Rt. medial apex and Rt. lateral mid gland. Work up with CT of the abdomen and pelvis was negative.  The patient presented to [facility] for further evaluation.  Today the patient states he feels well.  No complaints.     REVIEW OF SYSTEMS:   Review of Systems   Constitutional: Negative for chills, fever, malaise/fatigue and weight loss.   Respiratory: Negative for cough, sputum production and shortness of breath.    Cardiovascular: Negative for chest pain and palpitations.   Gastrointestinal: Negative for abdominal pain, constipation, diarrhea, nausea and vomiting.   Genitourinary: Negative for dysuria, frequency, hematuria and urgency.        AUA score is 0.  Sexually active with normal function         PAST MEDICAL HISTORY:  Past Medical History:   Diagnosis Date    Arthritis     Disorder of kidney and ureter     Elevated PSA     Gout     Hypertension     Prostatitis age 30's       PAST SURGICAL HISTORY:  Past Surgical History:   Procedure Laterality Date    KNEE CARTILAGE SURGERY Right     5     LAPAROSCOPIC NEPHRECTOMY Right 9/24/2018    Procedure: NEPHRECTOMY, LAPAROSCOPIC/ POSS OPEN;  Surgeon: Keven NOBLE  MD Cb;  Location: [facility] OR [address] [place] [address];  Service: Urology;  Laterality: Right;  3 HRS TAP BLOCK    NEPHRECTOMY         ALLERGIES:   Review of patient's allergies indicates:  No Known Allergies    MEDICATIONS:  Current Outpatient Medications   Medication Sig    allopurinol (ZYLOPRIM) 100 MG tablet Take 100 mg by mouth every morning.     azilsartan med-chlorthalidone (EDARBYCLOR) 40-25 mg Tab Take by mouth every evening.     polyethylene glycol (GLYCOLAX) 17 gram PwPk Take 17 g by mouth once daily.     No current facility-administered medications for this visit.       SOCIAL HISTORY:  Social History     Socioeconomic History    Marital status: Legally    Tobacco Use    Smoking status: Former Smoker     Packs/day: 0.50     Years: 6.00     Pack years: 3.00     Quit date:      Years since quittin.1    Smokeless tobacco: Never Used   Substance and Sexual Activity    Alcohol use: No    Drug use: No    Sexual activity: Yes     Partners: Female       FAMILY HISTORY:  Family History   Problem Relation Age of Onset    Cancer Father     Prostate cancer Father     Cancer Mother     Heart disease Mother          PHYSICAL EXAMINATION:  Vitals:    22 0954   BP: (!) 160/98   Pulse: 72   Resp: 16     Physical Exam  Constitutional:       General: He is not in acute distress.     Appearance: Normal appearance.   Pulmonary:      Effort: Pulmonary effort is normal. No respiratory distress.   Abdominal:      General: Abdomen is flat. There is no distension.      Tenderness: There is no abdominal tenderness.   Neurological:      Mental Status: He is alert and oriented to person, place, and time.   Psychiatric:         Mood and Affect: Mood normal.         Judgment: Judgment normal.     MRI of the prostate on 22 revealed a 25.2 cc prostate.  There was a 2.1 cm T2 hypointense lesion in the Rt. mid/base, PI-RADS 3.  There was no extraprostatic extension.  The neurovascular bundles and seminal  vesicles were normal.  There was no adenopathy.      ASSESSMENT/PLAN:  Clinical Stage IIB (T1c, N0, M0, GG2, PSA < 10) prostate cancer.     ECO    I had a long discussion with the patient.  Explained he is considered to have favorable intermediate risk prostate cancer.  We discussed the options of active surveillance although given his age and family concerns (recently he and his wife have welcomed a new baby) the patient is interested in definitive therapy.  The patient states he is not interested in surgical resection.  Discussed the options of external beam therapy vs. prostate brachytherapy.  Discussed the procedures, risks and benefits of therapy.   Recommend he consider prostate brachytherapy.  The patient is agreeable to proceed with prostate brachytherapy.   Will arrange for implantation in the next few weeks.     I spent approximately 45  minutes reviewing the available records and evaluating the patient, out of which over 50% of the time was spent face to face with the patient in counseling and coordinating this patient's care.

## 2022-02-15 ENCOUNTER — TELEPHONE (OUTPATIENT)
Dept: UROLOGY | Facility: CLINIC | Age: 57
End: 2022-02-15
Payer: MEDICARE

## 2022-02-15 DIAGNOSIS — C61 PROSTATE CANCER: Primary | ICD-10-CM

## 2022-02-15 PROCEDURE — 77295 3-D RADIOTHERAPY PLAN: CPT | Mod: TC | Performed by: RADIOLOGY

## 2022-02-15 PROCEDURE — 77295 PR 3D RADIOTHERAPY PLAN: ICD-10-PCS | Mod: 26,,, | Performed by: RADIOLOGY

## 2022-02-15 PROCEDURE — 77295 3-D RADIOTHERAPY PLAN: CPT | Mod: 26,,, | Performed by: RADIOLOGY

## 2022-02-22 ENCOUNTER — TELEPHONE (OUTPATIENT)
Dept: UROLOGY | Facility: CLINIC | Age: 57
End: 2022-02-22
Payer: MEDICARE

## 2022-02-22 DIAGNOSIS — C61 PROSTATE CANCER: Primary | ICD-10-CM

## 2022-02-23 ENCOUNTER — HOSPITAL ENCOUNTER (OUTPATIENT)
Facility: HOSPITAL | Age: 57
Discharge: HOME | End: 2022-02-23
Attending: UROLOGY | Admitting: UROLOGY
Payer: MEDICARE

## 2022-02-23 ENCOUNTER — ANESTHESIA EVENT (OUTPATIENT)
Dept: SURGERY | Facility: HOSPITAL | Age: 57
End: 2022-02-23
Payer: MEDICARE

## 2022-02-23 ENCOUNTER — ANESTHESIA (OUTPATIENT)
Dept: SURGERY | Facility: HOSPITAL | Age: 57
End: 2022-02-23
Payer: MEDICARE

## 2022-02-23 ENCOUNTER — HOSPITAL ENCOUNTER (OUTPATIENT)
Dept: RADIOLOGY | Facility: HOSPITAL | Age: 57
Discharge: HOME | End: 2022-02-23
Attending: UROLOGY | Admitting: UROLOGY
Payer: MEDICARE

## 2022-02-23 VITALS
DIASTOLIC BLOOD PRESSURE: 95 MMHG | HEART RATE: 72 BPM | WEIGHT: 260 LBS | BODY MASS INDEX: 33.37 KG/M2 | HEIGHT: 74 IN | OXYGEN SATURATION: 96 % | TEMPERATURE: 98 F | SYSTOLIC BLOOD PRESSURE: 158 MMHG | RESPIRATION RATE: 22 BRPM

## 2022-02-23 DIAGNOSIS — C61 PROSTATE CANCER: Primary | ICD-10-CM

## 2022-02-23 DIAGNOSIS — C61 PROSTATE CANCER: ICD-10-CM

## 2022-02-23 LAB
CTP QC/QA: YES
SARS-COV-2 AG RESP QL IA.RAPID: NEGATIVE

## 2022-02-23 PROCEDURE — 71000015 HC POSTOP RECOV 1ST HR: Performed by: UROLOGY

## 2022-02-23 PROCEDURE — D9220A PRA ANESTHESIA: Mod: ,,, | Performed by: ANESTHESIOLOGY

## 2022-02-23 PROCEDURE — D9220A PRA ANESTHESIA: ICD-10-PCS | Mod: ,,, | Performed by: ANESTHESIOLOGY

## 2022-02-23 PROCEDURE — 37000009 HC ANESTHESIA EA ADD 15 MINS: Performed by: UROLOGY

## 2022-02-23 PROCEDURE — 77370 RADIATION PHYSICS CONSULT: CPT | Performed by: RADIOLOGY

## 2022-02-23 PROCEDURE — 77263 PR  RADIATION THERAPY PLAN COMPLEX: ICD-10-PCS | Mod: ,,, | Performed by: RADIOLOGY

## 2022-02-23 PROCEDURE — 63600175 PHARM REV CODE 636 W HCPCS: Performed by: ANESTHESIOLOGY

## 2022-02-23 PROCEDURE — C1715 BRACHYTHERAPY NEEDLE: HCPCS | Performed by: UROLOGY

## 2022-02-23 PROCEDURE — 55875 PR PERCUT/NEEDLE INSERT,PROSTATE,RADIOISOT: ICD-10-PCS | Mod: ,,, | Performed by: UROLOGY

## 2022-02-23 PROCEDURE — 37000008 HC ANESTHESIA 1ST 15 MINUTES: Performed by: UROLOGY

## 2022-02-23 PROCEDURE — 25000003 PHARM REV CODE 250: Performed by: ANESTHESIOLOGY

## 2022-02-23 PROCEDURE — 77470 SPECIAL RADIATION TREATMENT: CPT | Mod: 26,59,, | Performed by: RADIOLOGY

## 2022-02-23 PROCEDURE — 77778 PR 77778 - APPLY INTERSTITIAL RADIATION COMPLEX: ICD-10-PCS | Mod: 26,,, | Performed by: RADIOLOGY

## 2022-02-23 PROCEDURE — 77470 SPECIAL RADIATION TREATMENT: CPT | Mod: 59,TC | Performed by: RADIOLOGY

## 2022-02-23 PROCEDURE — 76965 ECHO GUIDANCE RADIOTHERAPY: CPT | Mod: 26,,, | Performed by: UROLOGY

## 2022-02-23 PROCEDURE — 71000044 HC DOSC ROUTINE RECOVERY FIRST HOUR: Performed by: UROLOGY

## 2022-02-23 PROCEDURE — 77790 RADIATION HANDLING: CPT | Mod: TC | Performed by: RADIOLOGY

## 2022-02-23 PROCEDURE — 77263 THER RADIOLOGY TX PLNG CPLX: CPT | Mod: ,,, | Performed by: RADIOLOGY

## 2022-02-23 PROCEDURE — 77300 RADIATION THERAPY DOSE PLAN: CPT | Mod: TC | Performed by: RADIOLOGY

## 2022-02-23 PROCEDURE — 77778 APPLY INTERSTIT RADIAT COMPL: CPT | Mod: 26,,, | Performed by: RADIOLOGY

## 2022-02-23 PROCEDURE — 36000704 HC OR TIME LEV I 1ST 15 MIN: Performed by: UROLOGY

## 2022-02-23 PROCEDURE — C2640 BRACHYTX, STRANDED, P-103: HCPCS | Performed by: RADIOLOGY

## 2022-02-23 PROCEDURE — 63600175 PHARM REV CODE 636 W HCPCS: Performed by: STUDENT IN AN ORGANIZED HEALTH CARE EDUCATION/TRAINING PROGRAM

## 2022-02-23 PROCEDURE — 36000705 HC OR TIME LEV I EA ADD 15 MIN: Performed by: UROLOGY

## 2022-02-23 PROCEDURE — 55875 TRANSPERI NEEDLE PLACE PROS: CPT | Mod: ,,, | Performed by: UROLOGY

## 2022-02-23 PROCEDURE — 77470 PR  SPECIAL RADIATION TREATMENT: ICD-10-PCS | Mod: 26,59,, | Performed by: RADIOLOGY

## 2022-02-23 PROCEDURE — 77778 APPLY INTERSTIT RADIAT COMPL: CPT | Mod: TC | Performed by: RADIOLOGY

## 2022-02-23 PROCEDURE — 76965 CHG SONO GUIDE RADIOELEMT APPLIC: ICD-10-PCS | Mod: 26,,, | Performed by: UROLOGY

## 2022-02-23 PROCEDURE — 25000003 PHARM REV CODE 250: Performed by: UROLOGY

## 2022-02-23 DEVICE — NDL W/PD103 SEEDS STERILE: Type: IMPLANTABLE DEVICE | Site: PROSTATE | Status: FUNCTIONAL

## 2022-02-23 RX ORDER — LIDOCAINE HYDROCHLORIDE 20 MG/ML
JELLY TOPICAL
Status: DISCONTINUED | OUTPATIENT
Start: 2022-02-23 | End: 2022-02-23 | Stop reason: HOSPADM

## 2022-02-23 RX ORDER — HALOPERIDOL 5 MG/ML
0.5 INJECTION INTRAMUSCULAR EVERY 10 MIN PRN
Status: DISCONTINUED | OUTPATIENT
Start: 2022-02-23 | End: 2022-02-23 | Stop reason: HOSPADM

## 2022-02-23 RX ORDER — FENTANYL CITRATE 50 UG/ML
25 INJECTION, SOLUTION INTRAMUSCULAR; INTRAVENOUS EVERY 5 MIN PRN
Status: DISCONTINUED | OUTPATIENT
Start: 2022-02-23 | End: 2022-02-23 | Stop reason: HOSPADM

## 2022-02-23 RX ORDER — ACETAMINOPHEN 500 MG
1000 TABLET ORAL ONCE
Status: COMPLETED | OUTPATIENT
Start: 2022-02-23 | End: 2022-02-23

## 2022-02-23 RX ORDER — ACETAMINOPHEN 500 MG
1000 TABLET ORAL EVERY 8 HOURS
Qty: 42 TABLET | Refills: 0 | Status: SHIPPED | OUTPATIENT
Start: 2022-02-23 | End: 2022-03-02

## 2022-02-23 RX ORDER — DEXAMETHASONE SODIUM PHOSPHATE 4 MG/ML
INJECTION, SOLUTION INTRA-ARTICULAR; INTRALESIONAL; INTRAMUSCULAR; INTRAVENOUS; SOFT TISSUE
Status: DISCONTINUED | OUTPATIENT
Start: 2022-02-23 | End: 2022-02-23

## 2022-02-23 RX ORDER — ONDANSETRON 2 MG/ML
INJECTION INTRAMUSCULAR; INTRAVENOUS
Status: DISCONTINUED | OUTPATIENT
Start: 2022-02-23 | End: 2022-02-23

## 2022-02-23 RX ORDER — METHYLPREDNISOLONE 4 MG/1
TABLET ORAL
Qty: 21 EACH | Refills: 0 | Status: SHIPPED | OUTPATIENT
Start: 2022-02-23 | End: 2022-03-10

## 2022-02-23 RX ORDER — ROCURONIUM BROMIDE 10 MG/ML
INJECTION, SOLUTION INTRAVENOUS
Status: DISCONTINUED | OUTPATIENT
Start: 2022-02-23 | End: 2022-02-23

## 2022-02-23 RX ORDER — PROPOFOL 10 MG/ML
VIAL (ML) INTRAVENOUS
Status: DISCONTINUED | OUTPATIENT
Start: 2022-02-23 | End: 2022-02-23

## 2022-02-23 RX ORDER — PHENYLEPHRINE HCL IN 0.9% NACL 1 MG/10 ML
SYRINGE (ML) INTRAVENOUS
Status: DISCONTINUED | OUTPATIENT
Start: 2022-02-23 | End: 2022-02-23

## 2022-02-23 RX ORDER — IBUPROFEN 800 MG/1
800 TABLET ORAL 3 TIMES DAILY
Qty: 21 TABLET | Refills: 0 | Status: SHIPPED | OUTPATIENT
Start: 2022-02-23 | End: 2022-03-02

## 2022-02-23 RX ORDER — SODIUM CHLORIDE 0.9 % (FLUSH) 0.9 %
3 SYRINGE (ML) INJECTION
Status: DISCONTINUED | OUTPATIENT
Start: 2022-02-23 | End: 2022-02-23 | Stop reason: HOSPADM

## 2022-02-23 RX ORDER — FENTANYL CITRATE 50 UG/ML
INJECTION, SOLUTION INTRAMUSCULAR; INTRAVENOUS
Status: DISCONTINUED | OUTPATIENT
Start: 2022-02-23 | End: 2022-02-23

## 2022-02-23 RX ORDER — SULFAMETHOXAZOLE AND TRIMETHOPRIM 800; 160 MG/1; MG/1
1 TABLET ORAL 2 TIMES DAILY
Qty: 10 TABLET | Refills: 0 | Status: SHIPPED | OUTPATIENT
Start: 2022-02-23 | End: 2022-02-28

## 2022-02-23 RX ORDER — ACETAMINOPHEN 500 MG
TABLET ORAL
Status: DISCONTINUED
Start: 2022-02-23 | End: 2022-02-23 | Stop reason: HOSPADM

## 2022-02-23 RX ORDER — MIDAZOLAM HYDROCHLORIDE 1 MG/ML
INJECTION, SOLUTION INTRAMUSCULAR; INTRAVENOUS
Status: DISCONTINUED | OUTPATIENT
Start: 2022-02-23 | End: 2022-02-23

## 2022-02-23 RX ORDER — TAMSULOSIN HYDROCHLORIDE 0.4 MG/1
0.4 CAPSULE ORAL DAILY
Qty: 30 CAPSULE | Refills: 0 | Status: SHIPPED | OUTPATIENT
Start: 2022-02-23 | End: 2022-03-28 | Stop reason: SDUPTHER

## 2022-02-23 RX ORDER — LIDOCAINE HYDROCHLORIDE 20 MG/ML
INJECTION, SOLUTION EPIDURAL; INFILTRATION; INTRACAUDAL; PERINEURAL
Status: DISCONTINUED | OUTPATIENT
Start: 2022-02-23 | End: 2022-02-23

## 2022-02-23 RX ADMIN — ONDANSETRON 4 MG: 2 INJECTION, SOLUTION INTRAMUSCULAR; INTRAVENOUS at 04:02

## 2022-02-23 RX ADMIN — DEXAMETHASONE SODIUM PHOSPHATE 4 MG: 4 INJECTION INTRA-ARTICULAR; INTRALESIONAL; INTRAMUSCULAR; INTRAVENOUS; SOFT TISSUE at 03:02

## 2022-02-23 RX ADMIN — MIDAZOLAM 2 MG: 1 INJECTION INTRAMUSCULAR; INTRAVENOUS at 03:02

## 2022-02-23 RX ADMIN — PROPOFOL 200 MG: 10 INJECTION, EMULSION INTRAVENOUS at 03:02

## 2022-02-23 RX ADMIN — ACETAMINOPHEN 1000 MG: 500 TABLET ORAL at 01:02

## 2022-02-23 RX ADMIN — LIDOCAINE HYDROCHLORIDE 100 MG: 20 INJECTION, SOLUTION EPIDURAL; INFILTRATION; INTRACAUDAL at 03:02

## 2022-02-23 RX ADMIN — Medication 100 MCG: at 03:02

## 2022-02-23 RX ADMIN — FENTANYL CITRATE 50 MCG: 50 INJECTION INTRAMUSCULAR; INTRAVENOUS at 03:02

## 2022-02-23 RX ADMIN — FENTANYL CITRATE 25 MCG: 50 INJECTION INTRAMUSCULAR; INTRAVENOUS at 05:02

## 2022-02-23 RX ADMIN — SODIUM CHLORIDE: 9 INJECTION, SOLUTION INTRAVENOUS at 02:02

## 2022-02-23 RX ADMIN — SODIUM CHLORIDE: 9 INJECTION, SOLUTION INTRAVENOUS at 04:02

## 2022-02-23 RX ADMIN — SUGAMMADEX 400 MG: 100 INJECTION, SOLUTION INTRAVENOUS at 04:02

## 2022-02-23 RX ADMIN — ROCURONIUM BROMIDE 50 MG: 10 INJECTION, SOLUTION INTRAVENOUS at 03:02

## 2022-02-23 RX ADMIN — PROPOFOL 40 MG: 10 INJECTION, EMULSION INTRAVENOUS at 05:02

## 2022-02-23 RX ADMIN — CEFTRIAXONE 1 G: 1 INJECTION, POWDER, FOR SOLUTION INTRAMUSCULAR; INTRAVENOUS at 03:02

## 2022-02-23 NOTE — INTERVAL H&P NOTE
The patient has been examined and the H&P has been reviewed:    I concur with the findings and no changes have occurred since H&P was written.    Surgery risks, benefits and alternative options discussed and understood by patient/family.    Patient was assessed preoperatively, found to be appropriate in behavior. The risks, benefits, and alternatives to procedures were discussed, and questions were answered. Plan to proceed with described procedure. Urine negative for all tested components.    Patient Active Problem List    Diagnosis Date Noted    Prostate cancer 01/25/2022    Cancer of kidney parenchyma, right 04/18/2019    Fatty liver 09/20/2018    Gout 09/19/2018    Arthritis 09/19/2018    Class 2 obesity with body mass index (BMI) of 35.0 to 35.9 in adult 09/19/2018    Tattoos 09/19/2018    Snoring 09/19/2018    Renal impairment 09/19/2018    Hypercalcemia 09/19/2018    Serum total bilirubin elevated 09/19/2018    Right renal mass 09/19/2018    Essential hypertension 09/18/2018

## 2022-02-23 NOTE — ANESTHESIA PROCEDURE NOTES
Intubation    Date/Time: 2/23/2022 3:12 PM  Performed by: Behzad Colby Jr., MD  Authorized by: Behzad Colby Jr., MD     Intubation:     Induction:  Intravenous    Intubated:  Postinduction    Mask Ventilation:  Easy with oral airway    Attempts:  1    Attempted By:  Staff anesthesiologist (case personally performed by Dr. Colby)    Method of Intubation:  Direct    Blade:  Franks 2    Laryngeal View Grade: Grade IIA - cords partially seen      Difficult Airway Encountered?: No      Complications:  None    Airway Device:  Oral endotracheal tube    Airway Device Size:  7.5    Style/Cuff Inflation:  Cuffed    Inflation Amount (mL):  8    Tube secured:  23    Placement Verified By:  Capnometry    Complicating Factors:  None    Findings Post-Intubation:  BS equal bilateral

## 2022-02-23 NOTE — TRANSFER OF CARE
"Anesthesia Transfer of Care Note    Patient: Jesu Reynolds    Procedure(s) Performed: Procedure(s) (LRB):  INSERTION, RADIOACTIVE SEED (N/A)    Patient location: PACU    Transport from OR: Transported from OR on 6-10 L/min O2 by face mask with adequate spontaneous ventilation    Post pain: adequate analgesia    Post assessment: no apparent anesthetic complications    Post vital signs: stable    Level of consciousness: sedated    Nausea/Vomiting: no nausea/vomiting    Complications: none    Transfer of care protocol was followed      Last vitals:   Visit Vitals  BP (!) 162/93 (BP Location: Left arm, Patient Position: Lying)   Pulse 81   Temp 36.7 °C (98.1 °F) (Temporal)   Resp 20   Ht 6' 2" (1.88 m)   Wt 117.9 kg (260 lb)   SpO2 100%   BMI 33.38 kg/m²     "

## 2022-02-23 NOTE — OP NOTE
[facility]  Operative Note    Date: 02/23/2022    Pre-Op Diagnosis: Prostate Cancer    Post-Op Diagnosis: same    Procedure(s) Performed:   1. Transrectal ultrasound of prostate  2. Ultrasound guided needle placement  3. Radioactive seed implantation in prostate  4. Fluoroscopy <1 hour    Specimen(s): none    Staff Surgeon: Ishan Dwyer MD    Assistant Surgeon: Lucien Louie MD    Anesthesia: General endotracheal anesthesia    Indications: Jesu Reynolds is a 56 y.o. male with prostate cancer presenting for brachytherapy seed implant.    Findings:   91 seeds    Estimated Blood Loss: min    Drains: none    Procedure in detail:  The patient confirmed he had taken his pre-procedure antibiotics and did the bowel prep.  After risks benefits and possible complications of placement of radioactive seed placement were discussed, the patient elected to undergo the procedure and informed consent was obtained. All questions were answered in the pre-operative area. The patient was transferred to cystoscopy suite and placed in the supine position.  SCDs were applied and working.  Anesthesia was administered.  Once adequately sedated, the patient was placed in dorsal lithotomy position and prepped and draped in the usual sterile fashion. Time out was performed, mariel-procedural antibiotics were confirmed    A 16-Thai Acosta catheter was advanced into the bladder and 10 mL of sterile water was used to inflate the balloon.  The patient's bladder was emptied completely and filled with 120 mL of  sterile water.    The US probe was introduced into the patient's rectum and the secured with stabilizers in coordination with the patients previous prostate mapping.      A total of 91 preloaded seeds were placed trans perineally into the prostate in accordance to the patients previous prostate mapping under the guidance of radiation oncology, who was present throughout the procedure.      A KUB was taken at the end of  the procedure to confirm seed implantation.       Follow up care:  The patient will follow up with Dr. Dwyer as scheduled.  He will get a CT scan of the abdomen and pelvis before leaving today. He will be discharged home with prescriptions for antibiotics, medrol dose pack, ibuprofen 800 TID, and flomax      Lucien oLuie MD

## 2022-02-23 NOTE — OP NOTE
[facility]  Operative Note    Date: 02/23/2022    Pre-Op Diagnosis: Prostate Cancer    Post-Op Diagnosis: same    Procedure(s) Performed:   1. Transrectal ultrasound of prostate  2. Ultrasound guided needle placement  3. Radioactive seed implantation in prostate  4. Fluoroscopy <1 hour    Specimen(s): none    Staff Surgeon: Keven Vivar MD    Assistant Surgeon: Lucien Louie MD    Anesthesia: General endotracheal anesthesia    Indications: Jesu Reynolds is a 56 y.o. male with prostate cancer presenting for brachytherapy seed implant.    Findings:   91 seeds    Estimated Blood Loss: min    Drains: none    Procedure in detail:  The patient confirmed he had taken his pre-procedure antibiotics and did the bowel prep.  After risks benefits and possible complications of placement of radioactive seed placement were discussed, the patient elected to undergo the procedure and informed consent was obtained. All questions were answered in the pre-operative area. The patient was transferred to cystoscopy suite and placed in the supine position.  SCDs were applied and working.  Anesthesia was administered.  Once adequately sedated, the patient was placed in dorsal lithotomy position and prepped and draped in the usual sterile fashion. Time out was performed, mariel-procedural antibiotics were confirmed    A 16-Panamanian Acosta catheter was advanced into the bladder and 10 mL of sterile water was used to inflate the balloon.  The patient's bladder was emptied completely and filled with 120 mL of  sterile water.    The US probe was introduced into the patient's rectum and the secured with stabilizers in coordination with the patients previous prostate mapping.      A total of 91 preloaded seeds were placed trans perineally into the prostate in accordance to the patients previous prostate mapping under the guidance of radiation oncology, who was present throughout the procedure.      A KUB was taken at the end of  the procedure to confirm seed implantation.       Follow up care:  The patient will follow up with Dr. Dwyer as scheduled.  He will get a CT scan of the abdomen and pelvis before leaving today. He will be discharged home with prescriptions for antibiotics, medrol dose pack, ibuprofen 800 TID, and flomax      Lucien Louie MD

## 2022-02-23 NOTE — ANESTHESIA POSTPROCEDURE EVALUATION
Anesthesia Post Evaluation    Patient: Jesu Reynolds    Procedure(s) Performed: Procedure(s) (LRB):  INSERTION, RADIOACTIVE SEED (N/A)    Final Anesthesia Type: general      Patient location during evaluation: PACU  Patient participation: Yes- Able to Participate  Level of consciousness: oriented and awake  Post-procedure vital signs: reviewed and stable  Pain management: adequate  Airway patency: patent    PONV status at discharge: No PONV  Anesthetic complications: no      Cardiovascular status: stable  Respiratory status: unassisted, spontaneous ventilation and face mask  Hydration status: euvolemic  Follow-up not needed.          Vitals Value Taken Time   /88 02/23/22 1632   Temp  02/23/22 1633   Pulse 87 02/23/22 1628   Resp 20 02/23/22 1619   SpO2 81 % 02/23/22 1633   Vitals shown include unvalidated device data.      No case tracking events are documented in the log.      Pain/Daina Score: Pain Rating Prior to Med Admin: 4 (2/23/2022  1:58 PM)  Daina Score: 9 (2/23/2022  4:19 PM)

## 2022-02-23 NOTE — ANESTHESIA PREPROCEDURE EVALUATION
02/23/2022  Jesu Reynolds is a 56 y.o., male.      Pre-op Assessment    I have reviewed the Patient Summary Reports.     I have reviewed the Nursing Notes. I have reviewed the NPO Status.   I have reviewed the Medications.     Review of Systems  Anesthesia Hx:  No problems with previous Anesthesia  History of prior surgery of interest to airway management or planning: Denies Family Hx of Anesthesia complications.   Denies Personal Hx of Anesthesia complications.   Hematology/Oncology:  Hematology Normal      Current/Recent Cancer.   EENT/Dental:EENT/Dental Normal   Cardiovascular:   Exercise tolerance: good Hypertension, well controlled ECG has been reviewed.    Pulmonary:  Pulmonary Normal    Renal/:   Chronic Renal Disease    Hepatic/GI:   Liver Disease,    Musculoskeletal:  Musculoskeletal Normal    Neurological:  Neurology Normal    Endocrine:  Endocrine Normal  Obesity / BMI > 30  Dermatological:  Skin Normal    Psych:  Psychiatric Normal           Physical Exam  General: Well nourished, Cooperative, Alert and Oriented    Airway:  Mallampati: II   Mouth Opening: Normal  TM Distance: Normal  Tongue: Normal  Neck ROM: Normal ROM    Dental:  Intact        Anesthesia Plan  Type of Anesthesia, risks & benefits discussed:    Anesthesia Type: Gen ETT, Gen Supraglottic Airway  Intra-op Monitoring Plan: Standard ASA Monitors  Post Op Pain Control Plan: multimodal analgesia and IV/PO Opioids PRN  Induction:  IV  Airway Plan: Direct, Post-Induction  ASA Score: 2  Day of Surgery Review of History & Physical: H&P Update referred to the surgeon/provider.    Ready For Surgery From Anesthesia Perspective.     .

## 2022-02-23 NOTE — DISCHARGE SUMMARY
[facility]  Discharge Note  Short Stay    Admit Date: 2/23/2022    Discharge Date and Time: 02/23/2022 4:07 PM      Attending Physician: Keven Vivar MD     Discharge Provider: Lucien Louie    Diagnoses:  Past Medical History:   Diagnosis Date    Arthritis     Disorder of kidney and ureter     Elevated PSA     Gout     Hypertension     Prostatitis age 31's       Discharged Condition: good    Hospital Course: Patient was admitted for brachytherapy seed implant and tolerated the procedure well with no complications. The patient was discharged home in good condition on the same day.       Final Diagnoses: Same as principal problem.    Disposition: Home or Self Care    Follow up/Patient Instructions:    Medications:  Reconciled Home Medications:   Current Discharge Medication List      START taking these medications    Details   acetaminophen (TYLENOL) 500 MG tablet Take 2 tablets (1,000 mg total) by mouth every 8 (eight) hours. for 7 days  Qty: 42 tablet, Refills: 0      ibuprofen (ADVIL,MOTRIN) 800 MG tablet Take 1 tablet (800 mg total) by mouth 3 (three) times daily. for 7 days  Qty: 21 tablet, Refills: 0      methylPREDNISolone (MEDROL DOSEPACK) 4 mg tablet use as directed  Qty: 21 each, Refills: 0      sulfamethoxazole-trimethoprim 800-160mg (BACTRIM DS) 800-160 mg Tab Take 1 tablet by mouth 2 (two) times daily. for 5 days  Qty: 10 tablet, Refills: 0      tamsulosin (FLOMAX) 0.4 mg Cap Take 1 capsule (0.4 mg total) by mouth once daily.  Qty: 30 capsule, Refills: 0         CONTINUE these medications which have NOT CHANGED    Details   azilsartan med-chlorthalidone (EDARBYCLOR) 40-25 mg Tab Take by mouth every evening.       allopurinol (ZYLOPRIM) 100 MG tablet Take 100 mg by mouth daily as needed.           Discharge Procedure Orders   CT Guidance Rad Therapy Field Non   Standing Status: Future Standing Exp. Date: 02/23/23     Order Specific Question Answer Comments   May the Radiologist  modify the order per protocol to meet the clinical needs of the patient? Yes      Notify your health care provider if you experience any of the following:  temperature >100.4     Notify your health care provider if you experience any of the following:  persistent nausea and vomiting or diarrhea     Notify your health care provider if you experience any of the following:  severe uncontrolled pain     Notify your health care provider if you experience any of the following:  difficulty breathing or increased cough     Notify your health care provider if you experience any of the following:  severe persistent headache     Notify your health care provider if you experience any of the following:  persistent dizziness, light-headedness, or visual disturbances     Notify your health care provider if you experience any of the following:  increased confusion or weakness      Follow-up Information     Ishan Dwyer Jr, MD Follow up on 3/10/2022.    Specialty: Radiation Oncology  Contact information:  [address] [facility] [address]  [phone]                         Discharge Procedure Orders (must include Diet, Follow-up, Activity):   Discharge Procedure Orders (must include Diet, Follow-up, Activity)   CT Guidance Rad Therapy Field Non   Standing Status: Future Standing Exp. Date: 02/23/23     Order Specific Question Answer Comments   May the Radiologist modify the order per protocol to meet the clinical needs of the patient? Yes      Notify your health care provider if you experience any of the following:  temperature >100.4     Notify your health care provider if you experience any of the following:  persistent nausea and vomiting or diarrhea     Notify your health care provider if you experience any of the following:  severe uncontrolled pain     Notify your health care provider if you experience any of the following:  difficulty breathing or increased cough     Notify your health care provider if you  experience any of the following:  severe persistent headache     Notify your health care provider if you experience any of the following:  persistent dizziness, light-headedness, or visual disturbances     Notify your health care provider if you experience any of the following:  increased confusion or weakness

## 2022-02-24 PROCEDURE — 77318 BRACHYTX ISODOSE COMPLEX: CPT | Mod: TC | Performed by: RADIOLOGY

## 2022-02-24 PROCEDURE — 77318 BRACHYTX ISODOSE COMPLEX: CPT | Mod: 26,59,, | Performed by: RADIOLOGY

## 2022-02-24 PROCEDURE — 77318 PR BRACYTHERAPY ISODOSE PLAN; COMPLEX: ICD-10-PCS | Mod: 26,59,, | Performed by: RADIOLOGY

## 2022-02-25 ENCOUNTER — TELEPHONE (OUTPATIENT)
Dept: RADIATION ONCOLOGY | Facility: CLINIC | Age: 57
End: 2022-02-25
Payer: MEDICARE

## 2022-03-09 ENCOUNTER — TELEPHONE (OUTPATIENT)
Dept: HEMATOLOGY/ONCOLOGY | Facility: CLINIC | Age: 57
End: 2022-03-09
Payer: MEDICARE

## 2022-03-10 ENCOUNTER — OFFICE VISIT (OUTPATIENT)
Dept: RADIATION ONCOLOGY | Facility: CLINIC | Age: 57
End: 2022-03-10
Payer: MEDICARE

## 2022-03-10 DIAGNOSIS — C61 PROSTATE CANCER: Primary | ICD-10-CM

## 2022-03-10 PROCEDURE — 1160F RVW MEDS BY RX/DR IN RCRD: CPT | Mod: CPTII,95,, | Performed by: RADIOLOGY

## 2022-03-10 PROCEDURE — 1160F PR REVIEW ALL MEDS BY PRESCRIBER/CLIN PHARMACIST DOCUMENTED: ICD-10-PCS | Mod: CPTII,95,, | Performed by: RADIOLOGY

## 2022-03-10 PROCEDURE — 1159F MED LIST DOCD IN RCRD: CPT | Mod: CPTII,95,, | Performed by: RADIOLOGY

## 2022-03-10 PROCEDURE — 99499 UNLISTED E&M SERVICE: CPT | Mod: 95,,, | Performed by: RADIOLOGY

## 2022-03-10 PROCEDURE — 99499 NO LOS: ICD-10-PCS | Mod: 95,,, | Performed by: RADIOLOGY

## 2022-03-10 PROCEDURE — 1159F PR MEDICATION LIST DOCUMENTED IN MEDICAL RECORD: ICD-10-PCS | Mod: CPTII,95,, | Performed by: RADIOLOGY

## 2022-03-10 NOTE — PROGRESS NOTES
Subjective:       Patient ID: Jesu Reynolds is a 56 y.o. male.    Chief Complaint: No chief complaint on file.    The patient location is: home  The chief complaint leading to consultation is: prostate cancer     Visit type: audiovisual    Face to Face time with patient: 20 mintues  30 minutes of total time spent on the encounter, which includes face to face time and non-face to face time preparing to see the patient (eg, review of tests), Obtaining and/or reviewing separately obtained history, Documenting clinical information in the electronic or other health record, Independently interpreting results (not separately reported) and communicating results to the patient/family/caregiver, or Care coordination (not separately reported).     Each patient to whom he or she provides medical services by telemedicine is:  (1) informed of the relationship between the physician and patient and the respective role of any other health care provider with respect to management of the patient; and (2) notified that he or she may decline to receive medical services by telemedicine and may withdraw from such care at any time.    Mr. Reynolds has a history of Stage IIB (T1c, N0, M0, P<10, G2) adenocarcinoma of the prostate.  He was referred for evaluation after screening PSA in September of 2021 returned elevated at 7.6 ng/ml.  Biopsies of the prostate on 11/2/21 revealed Clarence 7 (3+4) adenocarcinoma involving 20% of the core from the Rt. medial base and 9% of the core from the Rt. medial mid gland.  There was a number of areas of HGPIN with suspicious cells in the Lt. medial mid gland, Lt. medial apex, Rt. medial apex and Rt. lateral mid gland. Work up with CT of the abdomen and pelvis was negative.  The patient presented to [facility] for further evaluation.  MRI of the prostate on 1/25/22 revealed a 25.2 cc prostate.  There was a 2.1 cm T2 hypointense lesion in the Rt. mid/base, PI-RADS 3.  There was no extraprostatic extension.  The  neurovascular bundles and seminal vesicles were normal.  There was no adenopathy.      The patient elected to proceed with prostate brachytherapy and on 2/23/22 underwent transperineal implantation of the prostate gland with Palladium 103 seeds.  A total of 91 seeds were placed in the prostate gland.  Each seed measured 1.7 mCi.  The patient tolerated the procedure well.  Review of the postimplant dosimetry revealed excellent placement of the seeds with 96.4% of the prostate contained within the 100% isodose volume of 125 Gy.  The V90 was 98.2% and D90 was 124%.  Today the patient states he feels well.  Some urinary hesitancy.  Denies dysuria or hematuria.     Review of Systems   Constitutional: Negative for activity change, appetite change, chills and fatigue.   Respiratory: Negative for cough and shortness of breath.    Gastrointestinal: Negative for abdominal pain, constipation, diarrhea and fecal incontinence.   Genitourinary: Negative for bladder incontinence, difficulty urinating, dysuria, frequency and hematuria.         Objective:      Physical Exam  Constitutional:       General: He is not in acute distress.     Appearance: Normal appearance.   Neurological:      Mental Status: He is alert and oriented to person, place, and time.   Psychiatric:         Mood and Affect: Mood normal.         Judgment: Judgment normal.         Assessment:         Prostate cancer   Plan:       Doing well, tolerating the acute effects of the radiotherapy well.  Will continue tamsulosin with tylenol prn.  Plan follow up with us in 2 weeks for physics check.

## 2022-03-25 ENCOUNTER — PATIENT MESSAGE (OUTPATIENT)
Dept: HEMATOLOGY/ONCOLOGY | Facility: CLINIC | Age: 57
End: 2022-03-25
Payer: MEDICARE

## 2022-03-28 DIAGNOSIS — C61 PROSTATE CANCER: Primary | ICD-10-CM

## 2022-03-28 RX ORDER — TAMSULOSIN HYDROCHLORIDE 0.4 MG/1
0.4 CAPSULE ORAL DAILY
Qty: 90 CAPSULE | Refills: 2 | Status: SHIPPED | OUTPATIENT
Start: 2022-03-28 | End: 2022-04-27

## 2022-03-30 PROBLEM — C64.9 PAPILLARY RENAL CELL CARCINOMA: Status: ACTIVE | Noted: 2019-04-18

## 2022-03-31 ENCOUNTER — TELEPHONE (OUTPATIENT)
Dept: HEMATOLOGY/ONCOLOGY | Facility: CLINIC | Age: 57
End: 2022-03-31
Payer: MEDICARE

## 2022-03-31 ENCOUNTER — PATIENT MESSAGE (OUTPATIENT)
Dept: HEMATOLOGY/ONCOLOGY | Facility: CLINIC | Age: 57
End: 2022-03-31
Payer: MEDICARE

## 2022-03-31 NOTE — TELEPHONE ENCOUNTER
Called pt regarding appt today with Barry @8:30am. Unable to leave voicemail , mailbox isn't setup.

## 2023-12-22 NOTE — PROGRESS NOTES
Pt resting comfortably.  Bilateral erector spinae PNCs infusing. Dressing CDI to right.  Dressing to left off.  Catheters discontinued, tips intact.  Pt tolerated well.  Educated regarding continued pain management.  Understanding verbalized.   

## 2024-06-10 NOTE — PROGRESS NOTES
"Clinic Note  6/10/2024      Subjective:         Chief Complaint:   SABA Ryenolds is a 59 y.o. male s/p right lap nephrectomy on 2018.  Recently diagnosed with prostate cancer. Just retired from [employer]. [employer]!  Has 29 y/o wife, has  son at home.     FH:positive father had prostate and colon cancer.  Biopsy 2021- [place] 3+4 (GG2) right middle 9%, right base 20%. 2/12 cores positive  PSA 7.64  Volume- 28 ccs  Stage T1C  NURYS- 3 intermediate  NCCN- favorable intermediate   MRI 2022- RMTZ 1.6 cm PI-RADS  lesion. Close to capsule at 9 o'clock. Negative NVBI (neurovascular bundle involvement), SVI (seminal vesicle involvement), nodes.This is my review. Images have not been reported by radiology yet.  Germline:indicated, ordered.  Somatic:    Brachytherapy with Dr Dwyer on 2022.         Renal:  Path:  Procedure: Total nephrectomy.  Specimen laterality: Right.  Tumor size: 9.3 cm in greatest dimension.  Tumor focality: Unifocal  Histologic type: Papillary renal cell carcinoma, type 1  Sarcomatoid features: Not identified.  Rhabdoid features: Not identified.  Histologic grade: Grade 3  Tumor necrosis: Present, approximately 20% of tumor.  Tumor extension: Tumor extends into renal vein.  Margins: Involved by invasive carcinoma, renal vein margin.  Regional lymph nodes: None submitted or identified.  Pathologic findings in nonneoplastic kidney: None identified.  Pathologic stage;  Tumor stage: pT3a: tumor extends into renal vein.  Lymph node stage: pNx: Regional lymph nodes cannot be assessed.     2024-  Past history as above. No complaints.    No results found for: "PSA", "PSADIAG", "PSATOTAL", "PSAFREE", "PSAFREEPCT"   Past Medical History:   Diagnosis Date    Arthritis     Elevated PSA     Gout     Hypertension     Prostate cancer 2021    Prostatitis age 31's    Renal cell carcinoma 2018    Right     Family History   Problem Relation Name Age of Onset    Cancer " Mother breast     Heart disease Mother breast     Prostate cancer Father prostate/colon      Social History     Socioeconomic History    Marital status:    Tobacco Use    Smoking status: Former     Current packs/day: 0.00     Average packs/day: 0.5 packs/day for 6.0 years (3.0 ttl pk-yrs)     Types: Cigarettes     Start date:      Quit date:      Years since quittin.4    Smokeless tobacco: Never   Substance and Sexual Activity    Alcohol use: No    Drug use: No    Sexual activity: Yes     Partners: Female     Social Determinants of Health     Financial Resource Strain: Low Risk  (6/10/2024)    Overall Financial Resource Strain (CARDIA)     Difficulty of Paying Living Expenses: Not hard at all   Food Insecurity: No Food Insecurity (6/10/2024)    Hunger Vital Sign     Worried About Running Out of Food in the Last Year: Never true     Ran Out of Food in the Last Year: Never true   Physical Activity: Sufficiently Active (6/10/2024)    Exercise Vital Sign     Days of Exercise per Week: 7 days     Minutes of Exercise per Session: 30 min   Stress: No Stress Concern Present (6/10/2024)    [employer] - Occupational Stress Questionnaire     Feeling of Stress : Not at all   Housing Stability: Unknown (6/10/2024)    Housing Stability Vital Sign     Unable to Pay for Housing in the Last Year: No     Past Surgical History:   Procedure Laterality Date    COLONOSCOPY  2013    KNEE CARTILAGE SURGERY Right     5     LAPAROSCOPIC NEPHRECTOMY Right 2018    Procedure: NEPHRECTOMY, LAPAROSCOPIC/ POSS OPEN;  Surgeon: Keven Vivar MD;  Location: [facility] OR [address] [place] [address];  Service: Urology;  Laterality: Right;  3 HRS TAP BLOCK    RADIOACTIVE SEED IMPLANTATION N/A 2022    Procedure: INSERTION, RADIOACTIVE SEED;  Surgeon: Keven Vivar MD;  Location: [facility] OR [address] [place] [address];  Service: Urology;  Laterality: N/A;  1hr     Patient Active Problem List   Diagnosis    Essential hypertension  "   Gout    Arthritis    Class 2 obesity with body mass index (BMI) of 35.0 to 35.9 in adult    Tattoos    Snoring    Renal impairment    Hypercalcemia    Serum total bilirubin elevated    Right renal mass    Fatty liver    Papillary renal cell carcinoma    Prostate cancer     Review of Systems   Constitutional:  Negative for chills, fever and unexpected weight change.   HENT:  Negative for congestion and sore throat.    Respiratory:  Negative for cough and shortness of breath.    Cardiovascular:  Negative for chest pain and leg swelling.   Gastrointestinal:  Negative for abdominal pain, nausea and vomiting.   Genitourinary:  Negative for dysuria, flank pain and hematuria.   Neurological:  Negative for weakness.   Psychiatric/Behavioral:  Negative for agitation and confusion.          Objective:      There were no vitals taken for this visit.  Estimated body mass index is 33.38 kg/m² as calculated from the following:    Height as of 2/23/22: 6' 2" (1.88 m).    Weight as of 2/23/22: 117.9 kg (260 lb).  Physical Exam  Constitutional:       General: He is not in acute distress.  HENT:      Head: Normocephalic.   Cardiovascular:      Rate and Rhythm: Normal rate.   Pulmonary:      Effort: Pulmonary effort is normal.   Abdominal:      General: There is no distension.      Palpations: Abdomen is soft.      Tenderness: There is no abdominal tenderness. There is no right CVA tenderness or left CVA tenderness.   Musculoskeletal:         General: Normal range of motion.   Skin:     General: Skin is warm and dry.   Neurological:      Mental Status: He is alert.      Coordination: Coordination normal.   Psychiatric:         Behavior: Behavior normal.           Assessment and Plan:           Problem List Items Addressed This Visit       Right renal mass    Papillary renal cell carcinoma - Primary    Prostate cancer       Follow up:   CT chest, abd visit after.  PSA today.    Keven Vivar"

## 2024-06-11 ENCOUNTER — OFFICE VISIT (OUTPATIENT)
Dept: UROLOGY | Facility: CLINIC | Age: 59
End: 2024-06-11
Payer: MEDICARE

## 2024-06-11 ENCOUNTER — LAB VISIT (OUTPATIENT)
Dept: LAB | Facility: HOSPITAL | Age: 59
End: 2024-06-11
Payer: MEDICARE

## 2024-06-11 VITALS
HEIGHT: 74 IN | BODY MASS INDEX: 32.73 KG/M2 | DIASTOLIC BLOOD PRESSURE: 85 MMHG | SYSTOLIC BLOOD PRESSURE: 139 MMHG | HEART RATE: 67 BPM | WEIGHT: 255.06 LBS

## 2024-06-11 DIAGNOSIS — N28.89 RIGHT RENAL MASS: ICD-10-CM

## 2024-06-11 DIAGNOSIS — N28.89 RIGHT RENAL MASS: Primary | ICD-10-CM

## 2024-06-11 DIAGNOSIS — C64.9 PAPILLARY RENAL CELL CARCINOMA: ICD-10-CM

## 2024-06-11 DIAGNOSIS — C61 PROSTATE CANCER: ICD-10-CM

## 2024-06-11 LAB — COMPLEXED PSA SERPL-MCNC: 0.09 NG/ML (ref 0–4)

## 2024-06-11 PROCEDURE — 3079F DIAST BP 80-89 MM HG: CPT | Mod: CPTII,S$GLB,, | Performed by: UROLOGY

## 2024-06-11 PROCEDURE — 3008F BODY MASS INDEX DOCD: CPT | Mod: CPTII,S$GLB,, | Performed by: UROLOGY

## 2024-06-11 PROCEDURE — 1159F MED LIST DOCD IN RCRD: CPT | Mod: CPTII,S$GLB,, | Performed by: UROLOGY

## 2024-06-11 PROCEDURE — 99999 PR PBB SHADOW E&M-EST. PATIENT-LVL III: CPT | Mod: PBBFAC,,, | Performed by: UROLOGY

## 2024-06-11 PROCEDURE — 84153 ASSAY OF PSA TOTAL: CPT

## 2024-06-11 PROCEDURE — 1160F RVW MEDS BY RX/DR IN RCRD: CPT | Mod: CPTII,S$GLB,, | Performed by: UROLOGY

## 2024-06-11 PROCEDURE — 99215 OFFICE O/P EST HI 40 MIN: CPT | Mod: S$GLB,,, | Performed by: UROLOGY

## 2024-06-11 PROCEDURE — 3075F SYST BP GE 130 - 139MM HG: CPT | Mod: CPTII,S$GLB,, | Performed by: UROLOGY

## 2024-06-11 PROCEDURE — 36415 COLL VENOUS BLD VENIPUNCTURE: CPT

## 2024-06-11 RX ORDER — AZILSARTAN KAMEDOXOMIL AND CHLORTHALIDONE 40; 25 MG/1; MG/1
40 TABLET ORAL
COMMUNITY

## 2024-06-11 RX ORDER — ALLOPURINOL 100 MG/1
100 TABLET ORAL
COMMUNITY
End: 2024-06-18

## 2024-06-11 RX ORDER — TAMSULOSIN HYDROCHLORIDE 0.4 MG/1
1 CAPSULE ORAL
COMMUNITY
End: 2024-06-18

## 2024-06-11 RX ORDER — COLCHICINE 0.6 MG/1
0.6 TABLET ORAL 2 TIMES DAILY
COMMUNITY
Start: 2024-03-21

## 2024-07-31 ENCOUNTER — TELEPHONE (OUTPATIENT)
Dept: UROLOGY | Facility: CLINIC | Age: 59
End: 2024-07-31
Payer: MEDICARE

## 2024-07-31 NOTE — TELEPHONE ENCOUNTER
Spoke with patient who was able to provide acceptable patient identifiers prior to start of conversation.   Rescheduled Dr Vivar's clinic appt with patient.

## 2024-08-08 ENCOUNTER — TELEPHONE (OUTPATIENT)
Dept: UROLOGY | Facility: CLINIC | Age: 59
End: 2024-08-08
Payer: MEDICARE

## (undated) DEVICE — CLIP HEMO-LOK MLX LARGE LF

## (undated) DEVICE — Device

## (undated) DEVICE — SYR 10CC LUER LOCK

## (undated) DEVICE — IRRIGATOR ENDOSCOPY DISP.

## (undated) DEVICE — DRAPE ABDOMINAL TIBURON 14X11

## (undated) DEVICE — TAPE SILK 3IN

## (undated) DEVICE — TRAY CYSTO BASIN

## (undated) DEVICE — TROCAR ENDOPATH XCEL 12X100MM

## (undated) DEVICE — STAPLER INT LINEAR ARTC 3.5-45

## (undated) DEVICE — TUBING HF INSUFFLATION W/ FLTR

## (undated) DEVICE — TRAY FOLEY 16FR INFECTION CONT

## (undated) DEVICE — NDL 22GA X1 1/2 REG BEVEL

## (undated) DEVICE — SYR 50ML CATH TIP

## (undated) DEVICE — NDL INSUF ULTRA VERESS 120MM

## (undated) DEVICE — SOL NS 1000CC

## (undated) DEVICE — PACK CYSTO

## (undated) DEVICE — PLUG CATHETER STERILE FOLEY

## (undated) DEVICE — SYS CLSR DERMABOND PRINEO 22CM

## (undated) DEVICE — TRAY MINOR GEN SURG

## (undated) DEVICE — TROCAR ENDOPATH XCEL 5MM 7.5CM

## (undated) DEVICE — GOWN IMPERVIOUS PROTCT BLUE

## (undated) DEVICE — WAX BONE STERILE 2.5G

## (undated) DEVICE — SYR 30CC LUER LOCK

## (undated) DEVICE — GOWN SURG 2XL DISP TIE BACK

## (undated) DEVICE — ELECTRODE REM PLYHSV RETURN 9

## (undated) DEVICE — SUT CTD VICRYL UND BR CP-1

## (undated) DEVICE — SUT MCRYL PLUS 4-0 PS2 27IN

## (undated) DEVICE — PROSTATE SEEDING SET 18GA X 20

## (undated) DEVICE — SHEARS HARMONIC 5CM 36CM

## (undated) DEVICE — SYR 50CC LL